# Patient Record
Sex: MALE | Race: WHITE | NOT HISPANIC OR LATINO | Employment: FULL TIME | URBAN - METROPOLITAN AREA
[De-identification: names, ages, dates, MRNs, and addresses within clinical notes are randomized per-mention and may not be internally consistent; named-entity substitution may affect disease eponyms.]

---

## 2017-03-17 ENCOUNTER — TRANSFERRED RECORDS (OUTPATIENT)
Dept: CARDIOLOGY | Facility: CLINIC | Age: 57
End: 2017-03-17

## 2017-03-17 LAB
ALBUMIN SERPL-MCNC: 4.4 G/DL (ref 3.5–5.2)
ALP SERPL-CCNC: 83 U/L (ref 50–136)
ALT SERPL-CCNC: 54 U/L (ref 8–45)
ANION GAP SERPL CALCULATED.3IONS-SCNC: 5 MMOL/L (ref 5–18)
AST SERPL-CCNC: 33 U/L (ref 2–40)
BILIRUB SERPL-MCNC: 0.6 MG/DL (ref 0.2–1.2)
BUN SERPL-MCNC: 23 MG/DL (ref 8–25)
CALCIUM SERPL-MCNC: 9.6 MG/DL (ref 8.5–10.5)
CHLORIDE SERPLBLD-SCNC: 106 MMOL/L (ref 98–110)
CHOL/HDLC RATIO - QUEST: NORMAL
CHOLESTEROL, TOTAL: 158 MG/DL (ref 100–199)
CO2 SERPL-SCNC: 27 MMOL/L (ref 21–31)
CREAT SERPL-MCNC: 0.97 MG/DL (ref 0.72–1.25)
GFR SERPL CREATININE-BSD FRML MDRD: ABNORMAL ML/MIN/1.73M2
GLUCOSE SERPL-MCNC: 90 MG/DL (ref 65–100)
HCT VFR BLD AUTO: 43.5 % (ref 37–53)
HDLC SERPL-MCNC: 38 MG/DL
HEMOGLOBIN: 14.5 G/DL (ref 13.5–17.5)
LDL CHOLESTEROL: 97 MG/DL
PLATELET # BLD AUTO: 206 10^9/L (ref 140–440)
POTASSIUM SERPL-SCNC: 4.2 MMOL/L (ref 3.5–5)
PROT SERPL-MCNC: 7 G/DL (ref 6–8)
SODIUM SERPL-SCNC: 138 MMOL/L (ref 135–145)
TRIGL SERPL-MCNC: 116 MG/DL
WBC # BLD AUTO: 5.8 10*3/UL (ref 4.5–11)

## 2017-06-29 ENCOUNTER — PRE VISIT (OUTPATIENT)
Dept: CARDIOLOGY | Facility: CLINIC | Age: 57
End: 2017-06-29

## 2017-06-29 NOTE — TELEPHONE ENCOUNTER
Call back from patient - he set up self referral for rapid HR in middle of night that awakes him from sleep and then he when he sits upright gone within 30 seconds.  Denies chest pain, SOB, presyncope or syncope. Patient states he is an avid runner and exercises without complaint or concerns. PMD @ [facility] - patient states seen for annual visit approximately 2 months ago with labs - fax out to [facility] for records.   Debora Weathers RN 06/29/17 9:25 AM

## 2017-06-30 ENCOUNTER — OFFICE VISIT (OUTPATIENT)
Dept: CARDIOLOGY | Facility: CLINIC | Age: 57
End: 2017-06-30
Payer: COMMERCIAL

## 2017-06-30 ENCOUNTER — HOSPITAL ENCOUNTER (OUTPATIENT)
Dept: CARDIOLOGY | Facility: CLINIC | Age: 57
Discharge: HOME | End: 2017-06-30
Attending: INTERNAL MEDICINE | Admitting: INTERNAL MEDICINE
Payer: COMMERCIAL

## 2017-06-30 VITALS
HEART RATE: 85 BPM | OXYGEN SATURATION: 94 % | BODY MASS INDEX: 25.62 KG/M2 | DIASTOLIC BLOOD PRESSURE: 87 MMHG | WEIGHT: 179 LBS | SYSTOLIC BLOOD PRESSURE: 130 MMHG | HEIGHT: 70 IN

## 2017-06-30 DIAGNOSIS — G47.30 SLEEP APNEA, UNSPECIFIED TYPE: ICD-10-CM

## 2017-06-30 DIAGNOSIS — R00.2 PALPITATIONS: ICD-10-CM

## 2017-06-30 DIAGNOSIS — R00.2 PALPITATIONS: Primary | ICD-10-CM

## 2017-06-30 PROCEDURE — 99204 OFFICE O/P NEW MOD 45 MIN: CPT | Mod: 25 | Performed by: INTERNAL MEDICINE

## 2017-06-30 PROCEDURE — 93270 REMOTE 30 DAY ECG REV/REPORT: CPT

## 2017-06-30 PROCEDURE — 93272 ECG/REVIEW INTERPRET ONLY: CPT | Performed by: INTERNAL MEDICINE

## 2017-06-30 PROCEDURE — 93000 ELECTROCARDIOGRAM COMPLETE: CPT | Performed by: INTERNAL MEDICINE

## 2017-06-30 NOTE — PATIENT INSTRUCTIONS
1. Event monitor.    2. Follow up in one month.    If you have any questions or concerns, please call my nurse Debora Sainz at [phone].

## 2017-06-30 NOTE — LETTER
"6/30/2017    Mauricio Chowdary MD  [facility] [address]  [facility] [address]    RE: Don S Rosler       Dear Colleague,    I had the pleasure of seeing Michael S Rosler in the [facility].    PRIMARY CARE PROVIDER:  Mauricio Chowdary  [facility] [address]  [facility] [address]    REASON FOR VISIT/CHIEF COMPLAINT:  1. Palpitations.    HISTORY OF PRESENT ILLNESS:  Mr. Escalante is new to my practice.  He is a delightful 56-year-old  gentleman, who is employed as a  for [employer].  He is slim with a BMI of 25.74./M square, is an avid exerciser, has never used tobacco products.  He is on 40 mg of atorvastatin for hyperlipidemia, mild sleep apnea, and migraine headaches but is otherwise in excellent health.    For the last few weeks he has been waking up from sleep 1 to 2 times a night with brief, rapid palpitations.  He goes to bed at approximately 11 AM and wakes up at the 4 to 5 a.m. with \"racing of his heart\".  It resolves within a minute of his waking up.  It is not associated with chest pain, dyspnea, dizziness.  He has never been presyncopal or syncopal.  He was diagnosed with mild sleep apnea seven years ago and tried CPAP for 3-4 months but found it very difficult to sleep with it.  He now uses an oral appliance recommended by the sleep clinic and his snoring is significantly reduced.  He does not consume any caffeine, no alcohol, no over-the-counter energy drinks.  He exercises frequently at a health club, 4-5 times a week for 90 minutes doing weights, machines and cardio.  He never gets palpitations during exertion.  In fact, he recently biked 26 miles without any limitation.    Unfortunately, over the last two years, he has lost three family members - both parents and a sister (to a brain tumor).  He has been under understandable emotional stress, but has plenty of social support and thinks he has been dealing with this well.      REVIEW OF " "SYSTEMS:  A comprehensive 10-point review of systems was completed and the pertinent positives are documented in the history of present illness.    Skin:  Negative     Eyes:  Positive for glasses  ENT:  Negative    Respiratory:  Positive for sleep apnea;CPAP (uses oral device)  Cardiovascular:    Positive for;palpitations (racing, occurs @ hs wakes patient)  Gastroenterology: Negative    Genitourinary:  Positive for (hx kidney stones)    Musculoskeletal:  Negative    Neurologic:  Negative    Psychiatric:  Negative    Heme/Lymph/Imm:  Negative    Endocrine:  Negative      CURRENT MEDICATIONS:  Current Outpatient Prescriptions   Medication Sig Dispense Refill     Multiple Vitamins-Minerals (MULTIVITAMIN ADULTS PO) Take by mouth daily       Atorvastatin Calcium (LIPITOR PO) Take 40 mg by mouth At Bedtime        aspirin EC 81 MG tablet Take 81 mg by mouth daily         ALLERGIES:  No Known Allergies    PAST MEDICAL HISTORY:    Past Medical History:   Diagnosis Date     Hyperlipidemia      Insomnia      Keratosis seborrheica      Kidney stone      Migraine      Other anxiety states        PAST SURGICAL HISTORY:    Past Surgical History:   Procedure Laterality Date     HERNIA REPAIR, INGUINAL RT/LT  3/06    bilateral     VASECTOMY           FAMILY HISTORY:    Family History   Problem Relation Age of Onset     Chronic Obstructive Pulmonary Disease Mother      Neurologic Disorder Sister      Brain Tumor Sister 58     Other - See Comments Father 84     old age       SOCIAL HISTORY:  .  Lives with a girlfriend.  Works as a  for [employer].  Never used tobacco products.  Rare alcohol intake-one drink every two months.  No recreational drug use.    PHYSICAL EXAM:    Vitals: /87  Pulse 85  Ht 1.778 m (5' 10\")  Wt 81.2 kg (179 lb)  SpO2 94%  BMI 25.68 kg/m2    Constitutional: Comfortable at rest. Cooperative, alert and oriented, well developed, well nourished.  Eyes: Pupils equal and round, " conjunctivae and lids unremarkable, sclera white, no xanthalasma,   ENT: Satisfactory dentition.  No cyanosis or pallor.  Neck: Carotid pulses are full and equal bilaterally, no carotid bruit, no thyromegaly     Respiratory: Normal respiratory effort with symmetrical chest wall movements and no use of accessory muscles. Good air entry with normal breath sounds and no rales or wheeze.  Cardiovascular: Normal jugular venous pulse and pressure.  Normal carotid pulse character and volume.  No carotid bruit.  Apical impulse is undisplaced and normal to palpation without parasternal heave or thrill.  Heart sounds are normal and regular. No audible murmur. No S3, S4 or friction rub.    GI: Soft, nontender, no hepatosplenomegaly, no masses, active bowel sounds.    Lymph/Hematologic: Not examined.  Skin: No rash, erythema, ecchymosis.  Musculoskeletal: Normal muscle tone and strength. Normal gait. No spinal deformities.  Neuropsychiatric: Oriented to time place and person.  Affect normal.  No gross motor deficits.  Extremities: No edema. No clubbing or deformities.    DIAGNOSTIC DATA:  I reviewed up artery data and the pertinent tests.  Results were discussed with the patient.    LABORATORY DATA  - Reviewed in Care Everywhere.  TSH 1.37. Creatinine 0.97, Na 138, potassium 4.2, Hb 14.5.    ECG: Done today. Normal sinus rhythm, 78 BPM.    EXERCISE [facility]ARDIOGRAM[facility]  1.  Normal stress echocardiogram.  There are no EKG or echo wall motion abnormalities suggestive of ischemia.  2.  Average exercise capacity and cardiovascular conditioning.  3.  Normal heart rate and blood pressure response to exercise.  4.  No exercise-induced arrhythmias.    DIAGNOSES:  1. Palpitations.  2. Obstructive sleep apnea, mild, uses oral appliance. CPAP intolerant.  3. Hyperlipidemia.    ASSESSMENT:  The patient is a relatively healthy, slim and physically active  male with episodic tachy-palpitations that wake him up at night but  are not associated with any other concerning features. He has sleep apnea that maybe partially treated with an oral appliance. Atrial arrhythmias should be ruled out.    PLAN:  1. Event monitor.  2. Follow up in one month.  3. Advised follow up with his sleep physician.    It was a pleasure to visit with the patient.  I spent 30 minutes with the patient, greater than 50% of the time was spent in counseling and coordination of care. Questions were  answered to the patient's satisfaction.  Encounter Diagnoses   Name Primary?     Palpitations Yes     Sleep apnea, unspecified type        Orders Placed This Encounter   Procedures     Follow-Up with Cardiologist     EKG 12-lead complete w/read - Clinics (performed today)     Cardiac Event Monitor - Peds/Adult     Thank you for allowing me to participate in the care of your patient.    Sincerely,     Christina Bolivar MD     [facility]

## 2017-06-30 NOTE — PROGRESS NOTES
"REFERRING PROVIDER:  No referring provider defined for this encounter.    PRIMARY CARE PROVIDER:  Mauricio Chowdary  [facility] [address]  [facility] [id number]    REASON FOR VISIT/CHIEF COMPLAINT:  1. Palpitations.    HISTORY OF PRESENT ILLNESS:  Mr. Escalante is new to my practice.  He is a delightful 56-year-old  gentleman, who is employed as a  for [employer].  He is slim with a BMI of 25.74./M square, is an avid exerciser, has never used tobacco products.  He is on 40 mg of atorvastatin for hyperlipidemia, mild sleep apnea, and migraine headaches but is otherwise in excellent health.    For the last few weeks he has been waking up from sleep 1 to 2 times a night with brief, rapid palpitations.  He goes to bed at approximately 11 AM and wakes up at the 4 to 5 a.m. with \"racing of his heart\".  It resolves within a minute of his waking up.  It is not associated with chest pain, dyspnea, dizziness.  He has never been presyncopal or syncopal.  He was diagnosed with mild sleep apnea seven years ago and tried CPAP for 3-4 months but found it very difficult to sleep with it.  He now uses an oral appliance recommended by the sleep clinic and his snoring is significantly reduced.  He does not consume any caffeine, no alcohol, no over-the-counter energy drinks.  He exercises frequently at a health club, 4-5 times a week for 90 minutes doing weights, machines and cardio.  He never gets palpitations during exertion.  In fact, he recently biked 26 miles without any limitation.    Unfortunately, over the last two years, he has lost three family members - both parents and a sister (to a brain tumor).  He has been under understandable emotional stress, but has plenty of social support and thinks he has been dealing with this well.    REVIEW OF SYSTEMS:  A comprehensive 10-point review of systems was completed and the pertinent positives are documented in the history of present illness.    Skin:  " "Negative     Eyes:  Positive for glasses  ENT:  Negative    Respiratory:  Positive for sleep apnea;CPAP (uses oral device)  Cardiovascular:    Positive for;palpitations (racing, occurs @ hs wakes patient)  Gastroenterology: Negative    Genitourinary:  Positive for (hx kidney stones)    Musculoskeletal:  Negative    Neurologic:  Negative    Psychiatric:  Negative    Heme/Lymph/Imm:  Negative    Endocrine:  Negative      CURRENT MEDICATIONS:  Current Outpatient Prescriptions   Medication Sig Dispense Refill     Multiple Vitamins-Minerals (MULTIVITAMIN ADULTS PO) Take by mouth daily       Atorvastatin Calcium (LIPITOR PO) Take 40 mg by mouth At Bedtime        aspirin EC 81 MG tablet Take 81 mg by mouth daily         ALLERGIES:  No Known Allergies    PAST MEDICAL HISTORY:    Past Medical History:   Diagnosis Date     Hyperlipidemia      Insomnia      Keratosis seborrheica      Kidney stone      Migraine      Other anxiety states        PAST SURGICAL HISTORY:    Past Surgical History:   Procedure Laterality Date     HERNIA REPAIR, INGUINAL RT/LT  3/06    bilateral     VASECTOMY         FAMILY HISTORY:    Family History   Problem Relation Age of Onset     Chronic Obstructive Pulmonary Disease Mother      Neurologic Disorder Sister      Brain Tumor Sister 58     Other - See Comments Father 84     old age       SOCIAL HISTORY:  .  Lives with a girlfriend.  Works as a  for [employer].  Never used tobacco products.  Rare alcohol intake-one drink every two months.  No recreational drug use.    PHYSICAL EXAM:    Vitals: /87  Pulse 85  Ht 1.778 m (5' 10\")  Wt 81.2 kg (179 lb)  SpO2 94%  BMI 25.68 kg/m2    Constitutional: Comfortable at rest. Cooperative, alert and oriented, well developed, well nourished.  Eyes: Pupils equal and round, conjunctivae and lids unremarkable, sclera white, no xanthalasma,   ENT: Satisfactory dentition.  No cyanosis or pallor.  Neck: Carotid pulses are full and " equal bilaterally, no carotid bruit, no thyromegaly     Respiratory: Normal respiratory effort with symmetrical chest wall movements and no use of accessory muscles. Good air entry with normal breath sounds and no rales or wheeze.  Cardiovascular: Normal jugular venous pulse and pressure.  Normal carotid pulse character and volume.  No carotid bruit.  Apical impulse is undisplaced and normal to palpation without parasternal heave or thrill.  Heart sounds are normal and regular. No audible murmur. No S3, S4 or friction rub.    GI: Soft, nontender, no hepatosplenomegaly, no masses, active bowel sounds.    Lymph/Hematologic: Not examined.  Skin: No rash, erythema, ecchymosis.  Musculoskeletal: Normal muscle tone and strength. Normal gait. No spinal deformities.  Neuropsychiatric: Oriented to time place and person.  Affect normal.  No gross motor deficits.  Extremities: No edema. No clubbing or deformities.    DIAGNOSTIC DATA:  I reviewed up artery data and the pertinent tests.  Results were discussed with the patient.    LABORATORY DATA  - Reviewed in Care Everywhere.  TSH 1.37. Creatinine 0.97, Na 138, potassium 4.2, Hb 14.5.    ECG: Done today. Normal sinus rhythm, 78 BPM.    EXERCISE ECHOCARDIOGRAM: [employer].  1.  Normal stress echocardiogram.  There are no EKG or echo wall motion abnormalities suggestive of ischemia.  2.  Average exercise capacity and cardiovascular conditioning.  3.  Normal heart rate and blood pressure response to exercise.  4.  No exercise-induced arrhythmias.    DIAGNOSES:  1. Palpitations.  2. Obstructive sleep apnea, mild, uses oral appliance. CPAP intolerant.  3. Hyperlipidemia.    ASSESSMENT:  The patient is a relatively healthy, slim and physically active  male with episodic tachy-palpitations that wake him up at night but are not associated with any other concerning features. He has sleep apnea that maybe partially treated with an oral appliance. Atrial arrhythmias should be  ruled out.    PLAN:  1. Event monitor.  2. Follow up in one month.  3. Advised follow up with his sleep physician.      It was a pleasure to visit with the patient.  I spent 30 minutes with the patient, greater than 50% of the time was spent in counseling and coordination of care. Questions were  answered to the patient's satisfaction.  Encounter Diagnoses   Name Primary?     Palpitations Yes     Sleep apnea, unspecified type        Orders Placed This Encounter   Procedures     Follow-Up with Cardiologist     EKG 12-lead complete w/read - Clinics (performed today)     Cardiac Event Monitor - Peds/Adult       CC  REFERRING PROVIDER:  No referring provider defined for this encounter.

## 2017-06-30 NOTE — MR AVS SNAPSHOT
After Visit Summary   6/30/2017    Michael S Rosler    MRN: [id number]           Patient Information     Date Of Birth          1960        Visit Information        Provider Department      6/30/2017 7:45 AM Christina Boilvar MD [facility]        Today's Diagnoses     Palpitations    -  1    Sleep apnea, unspecified type          Care Instructions    1. Event monitor.    2. Follow up in one month.    If you have any questions or concerns, please call my nurse Debora Sainz at [phone]..          Follow-ups after your visit        Additional Services     Follow-Up with Cardiologist                 Future tests that were ordered for you today     Open Future Orders        Priority Expected Expires Ordered    Follow-Up with Cardiologist Routine 7/30/2017 6/30/2018 6/30/2017    Cardiac Event Monitor - Peds/Adult Routine 6/30/2017 6/30/2018 6/30/2017            Who to contact     If you have questions or need follow up information about today's clinic visit or your schedule please contact [facility] directly at [phone].  Normal or non-critical lab and imaging results will be communicated to you by Open Lendinghart, letter or phone within 4 business days after the clinic has received the results. If you do not hear from us within 7 days, please contact the clinic through [employer] or phone. If you have a critical or abnormal lab result, we will notify you by phone as soon as possible.  Submit refill requests through GateMe or call your pharmacy and they will forward the refill request to us. Please allow 3 business days for your refill to be completed.          Additional Information About Your Visit        Open Lendinghart Information     [employer]Me gives you secure access to your electronic health record. If you see a primary care provider, you can also send messages to your care team and make appointments. If you have  "questions, please call your primary care clinic.  If you do not have a primary care provider, please call [phone] and they will assist you.        Care EveryWhere ID     This is your Care EveryWhere ID. This could be used by other organizations to access your [place] medical records  [id number]        Your Vitals Were     Pulse Height Pulse Oximetry BMI (Body Mass Index)          85 1.778 m (5' 10\") 94% 25.68 kg/m2         Blood Pressure from Last 3 Encounters:   06/30/17 130/87   08/28/16 (!) 133/112   01/26/15 129/81    Weight from Last 3 Encounters:   06/30/17 81.2 kg (179 lb)   08/28/16 79.4 kg (175 lb)   01/26/15 77.1 kg (170 lb)              We Performed the Following     EKG 12-lead complete w/read - Clinics (performed today)        Primary Care Provider Office Phone # Fax #    Mauricio Chowdary [phone]       [facility] [address]  [facility] [address]        Equal Access to Services     [facility]: Hadii aad ku hadasho Soomaali, waaxda luqadaha, qaybta kaalmada adefernandoyasami, nica garces . So [facility] [phone].    ATENCIÓN: Si habla español, tiene a molina disposición servicios gratuitos de asistencia lingüística. [facility] [phone].    We comply with applicable federal civil rights laws and [place] laws. We do not discriminate on the basis of race, color, national origin, age, disability sex, sexual orientation or gender identity.            Thank you!     Thank you for choosing [facility] [place]  for your care. Our goal is always to provide you with excellent care. Hearing back from our patients is one way we can continue to improve our services. Please take a few minutes to complete the written survey that you may receive in the mail after your visit with us. Thank you!             Your Updated Medication List - Protect others around you: Learn how to safely use, store and throw away your medicines at " www.disposemymeds.org.          This list is accurate as of: 6/30/17  8:52 AM.  Always use your most recent med list.                   Brand Name Dispense Instructions for use Diagnosis    aspirin EC 81 MG EC tablet      Take 81 mg by mouth daily        LIPITOR PO      Take 40 mg by mouth At Bedtime        MULTIVITAMIN ADULTS PO      Take by mouth daily

## 2017-07-03 ENCOUNTER — CARE COORDINATION (OUTPATIENT)
Dept: CARDIOLOGY | Facility: CLINIC | Age: 57
End: 2017-07-03

## 2017-07-03 NOTE — PROGRESS NOTES
Patient called stating he had racing heart feeling last night while wearing monitor but noted his pulse check showed normal.  Encouraged patient to utilize the monitor as ordered to try and correlate sympotms with arrhythmia vs non-arrhythmia.  Patient states agreement to same.   Debora Weathers RN 07/03/17 11:59 AM

## 2017-07-06 NOTE — PROGRESS NOTES
Received Cardionet strips:  7/1/17 at 05:57 showing SR, HR 90, reported sx: none  7/1/17 at 13:00 showing SR, , reported sx: none  7/3/17 at 21:34 showing SR HR 70, reported sx: heart racing  7/3/17 at 23:22 showing SR, HR 80, reported sx: none    Filed to report in Dr. Bolivar's inbox. F/u w/ Dr. Bolivar on 8/4/17.

## 2017-07-14 NOTE — PROGRESS NOTES
Cardionet Event monitor strips - dates 7/7/20107 Sinus Rhythm to Sinus Tach HR's 96 to 115 bpm; 7/8/2017 Sinus Rhythm HR 72 bpm; 7/11/2017 Sinus Rhythm 62 bpm; 7/12/2017 Sinus Rhythm 70 bpm. Reported Symptoms of heart racing at time of monitor strips. Strips filed.  Debora Weathers RN 07/14/17 4:26 PM

## 2017-07-19 NOTE — PROGRESS NOTES
Cardionet Event monitor strip - date 7/15/2017 Sinus tachycardia 101 bpm. Filed.  Has visit scheduled with provider 7/27/2017.   Debora Weathers RN 07/19/17 11:35 AM

## 2017-07-27 ENCOUNTER — OFFICE VISIT (OUTPATIENT)
Dept: CARDIOLOGY | Facility: CLINIC | Age: 57
End: 2017-07-27
Attending: INTERNAL MEDICINE
Payer: COMMERCIAL

## 2017-07-27 VITALS
WEIGHT: 179 LBS | HEART RATE: 87 BPM | HEIGHT: 70 IN | DIASTOLIC BLOOD PRESSURE: 74 MMHG | BODY MASS INDEX: 25.62 KG/M2 | SYSTOLIC BLOOD PRESSURE: 117 MMHG

## 2017-07-27 DIAGNOSIS — G47.00 INSOMNIA, UNSPECIFIED TYPE: Primary | ICD-10-CM

## 2017-07-27 DIAGNOSIS — R00.2 PALPITATIONS: ICD-10-CM

## 2017-07-27 DIAGNOSIS — G47.30 SLEEP APNEA, UNSPECIFIED TYPE: ICD-10-CM

## 2017-07-27 PROCEDURE — 99213 OFFICE O/P EST LOW 20 MIN: CPT | Performed by: INTERNAL MEDICINE

## 2017-07-27 NOTE — LETTER
7/27/2017    Mauricio Chowdary MD  [employer]   [address]  [facility] [address]    RE: Don S Rosler       Dear Colleague,    I had the pleasure of seeing Don GEE Rosler in the [facility].    HPI/Assessment - See dictation [id number]      LOCATION:  [facility]    REFERRING AND PRIMARY CARE PROVIDER:  Dr. Mauricio Chowdary, [employer], [address], [place] [address]    REASON FOR VISIT:    1.  Followup of palpitations.  2.  Discuss test results -- event monitor.    HISTORY OF PRESENT ILLNESS:    The patient is known to me from his initial consult visit on 06/30/2017 for episodic palpitations that only occur when he is asleep and wake him up at night, and are very brief, lasting about 5 minutes without any associated symptoms.  He is a polite 56-year-old gentleman who is employed as a  for [employer].  He is slim with a BMI of 25.74 kg/meters squared and avid exerciser, has never smoked, does not drink caffeine or alcohol or over-the-counter energy drinks.  He is on 40 mg of atorvastatin for hyperlipidemia, has mild sleep apnea which is being corrected with an oral appliance, and intermittent migraine headaches but is otherwise in excellent health.    He has had a stressful last couple of years having undergone significant bereavement -- both his parents as well as a sister to a brain tumor.  His job is also fairly high stress which he self-identifies as something he thrives in.  He is also very involved in his 19-year-old son's life.  The patient bikes several miles a week, exercises at the health club 4-5 times a week for 90 minutes and is able to do that without any cardiovascular symptoms including palpitation.  There is no concerning family history.    CURRENT MEDICATIONS:    1.  Multivitamin.  2.  Atorvastatin 40 mg daily.    ALLERGIES:  No known.    SOCIAL HISTORY:  .  Has a girlfriend.  Works as a  for  [employer].  Nonsmoker, rare infrequent alcohol use.  No recreational drug use.  Avid exerciser.    PHYSICAL EXAMINATION:    Vital Signs:  Today showed a blood pressure of 117/74, pulse 87 beats per minutes, height 1.7, weight 81.2 kg, BMI 25.74 kg/meters squared.  General:  Comfortable at rest, slim, normal mood and affect, oriented, well developed, well nourished.  I did not do a detailed physical examination.    DIAGNOSTIC DATA:    His recent outside labs show a normal TSH of 1.37, creatinine 0.9.  Electrolytes are normal.  Hemoglobin is 14.5.     Recent ECG shows normal sinus rhythm at 78 beats per minute.    His exercise echocardiogram done at [facility] was normal with average exercise capacity and cardiovascular conditioning.  No arrhythmias.     I had ordered an event monitor which he has completed.  He did transmit several episodes of palpitations that woke him up from sleep.  During all of these, he was in normal sinus rhythm although with a slightly higher than expected heart rate ranging from the 70s to 120 bpm.  No arrhythmias.     DIAGNOSIS:    1.  Benign palpitations with no evidence of arrhythmia on event monitor but higher than expected sinus rates during sleep.   2.  Mild obstructive sleep apnea, uses oral appliance, CPAP intolerant.  3.  Hyperlipidemia.    ASSESSMENT/PLAN:    The patient is healthy 56 year old male, with a structurally normal heart, normal stress test, normal TSH, minimal stimulant use who has palpitations that only occur briefly and wake at night.  Although his event monitor reported that during his symptomatic episodes he is in sinus rhythm, his heart rate is somewhat higher than what would be expected at nighttime (2-5 a.m.).  The patient's states that he has insomnia and goes to sleep at 11 p.m. and wakes up at 5 a.m.     1.  I reassured him that we have not identified any arrhythmias during his symptoms.    2.  Recommend followup with a sleep provider to assess  for insomnia or other sleep disordered breathing.    3.  He also has been under significant emotional stress in the last 2 years, although overtly he is coping very well.  We did talk about relaxation techniques such as yoga and meditation and he is very keen to pursue this.     4.  Cardiology followup and additional testing not indicated.    It was a pleasure to visit with the patient.  I spent 20 minutes with him. Greater than 50% of the time was spent in counseling and coordination of care.     CC:  Dr. Gerry Chowdary, [facility],  [address], [place] [id number]        Christina Bolivar MD    D:  07/28/2017 12:18 T:  07/28/2017 16:09  Document:  [id number]    Thank you for allowing me to participate in the care of your patient.      Sincerely,     Christina Bolivar MD     [facility]

## 2017-07-27 NOTE — MR AVS SNAPSHOT
After Visit Summary   7/27/2017    Michael S Rosler    MRN: [id number]           Patient Information     Date Of Birth          1960        Visit Information        Provider Department      7/27/2017 7:45 AM Christina Bolivar MD [facility] [place]        Today's Diagnoses     Insomnia, unspecified type    -  1    Palpitations        Sleep apnea, unspecified type          Care Instructions    1. Refer to sleep clinic for insomnia evaluation.    2. No cardiology follow up.          Follow-ups after your visit        Additional Services     SLEEP EVALUATION & MANAGEMENT REFERRAL - ADULT       See above                  Future tests that were ordered for you today     Open Future Orders        Priority Expected Expires Ordered    SLEEP EVALUATION & MANAGEMENT REFERRAL - ADULT Routine 8/1/2017 7/27/2018 7/27/2017            Who to contact     If you have questions or need follow up information about today's clinic visit or your schedule please contact [facility] [place] directly at [phone].  Normal or non-critical lab and imaging results will be communicated to you by MyChart, letter or phone within 4 business days after the clinic has received the results. If you do not hear from us within 7 days, please contact the clinic through [employer] or phone. If you have a critical or abnormal lab result, we will notify you by phone as soon as possible.  Submit refill requests through [employer] or call your pharmacy and they will forward the refill request to us. Please allow 3 business days for your refill to be completed.          Additional Information About Your Visit        MyChart Information     [employer] gives you secure access to your electronic health record. If you see a primary care provider, you can also send messages to your care team and make appointments. If you have questions, please call your primary care clinic.  If you  "do not have a primary care provider, please call [phone] and they will assist you.        Care EveryWhere ID     This is your Care EveryWhere ID. This could be used by other organizations to access your [place] medical records  [id number]        Your Vitals Were     Pulse Height BMI (Body Mass Index)             87 1.778 m (5' 10\") 25.68 kg/m2          Blood Pressure from Last 3 Encounters:   07/27/17 117/74   06/30/17 130/87   08/28/16 (!) 133/112    Weight from Last 3 Encounters:   07/27/17 81.2 kg (179 lb)   06/30/17 81.2 kg (179 lb)   08/28/16 79.4 kg (175 lb)              We Performed the Following     Follow-Up with Cardiologist        Primary Care Provider Office Phone # Fax #    Mauricio Chowdary [phone]       [employer]  [address]  [facility] [address]        Equal Access to Services     KANDACE [facility]W : Hadii aad ku hadasho Soomaali, waaxda luqadaha, qaybta kaalmada adeegyada, waxay idiin haydavien vern garces . So [facility] [phone].    ATENCIÓN: Si habla español, tiene a molina disposición servicios gratuitos de asistencia lingüística. Llame al [phone].    We comply with applicable federal civil rights laws and [place] laws. We do not discriminate on the basis of race, color, national origin, age, disability sex, sexual orientation or gender identity.            Thank you!     Thank you for choosing [facility] [place]  for your care. Our goal is always to provide you with excellent care. Hearing back from our patients is one way we can continue to improve our services. Please take a few minutes to complete the written survey that you may receive in the mail after your visit with us. Thank you!             Your Updated Medication List - Protect others around you: Learn how to safely use, store and throw away your medicines at www.disposemymeds.org.          This list is accurate as of: 7/27/17  8:36 AM.  Always use your most recent " med list.                   Brand Name Dispense Instructions for use Diagnosis    LIPITOR PO      Take 40 mg by mouth At Bedtime        MULTIVITAMIN ADULTS PO      Take by mouth daily

## 2017-07-29 NOTE — PROGRESS NOTES
LOCATION:  [facility]    REFERRING AND PRIMARY CARE PROVIDER:  Dr. Mauricio Chowdary, [facility], [address], [place], [place] [address]    REASON FOR VISIT:    1.  Followup of palpitations.  2.  Discuss test results -- event monitor.    HISTORY OF PRESENT ILLNESS:    The patient is known to me from his initial consult visit on 06/30/2017 for episodic palpitations that only occur when he is asleep and wake him up at night, and are very brief, lasting about 5 minutes without any associated symptoms.  He is a polite 56-year-old gentleman who is employed as a  for [employer].  He is slim with a BMI of 25.74 kg/meters squared and avid exerciser, has never smoked, does not drink caffeine or alcohol or over-the-counter energy drinks.  He is on 40 mg of atorvastatin for hyperlipidemia, has mild sleep apnea which is being corrected with an oral appliance, and intermittent migraine headaches but is otherwise in excellent health.    He has had a stressful last couple of years having undergone significant bereavement -- both his parents as well as a sister to a brain tumor.  His job is also fairly high stress which he self-identifies as something he thrives in.  He is also very involved in his 19-year-old son's life.  The patient bikes several miles a week, exercises at the health club 4-5 times a week for 90 minutes and is able to do that without any cardiovascular symptoms including palpitation.  There is no concerning family history.    CURRENT MEDICATIONS:    1.  Multivitamin.  2.  Atorvastatin 40 mg daily.    ALLERGIES:  No known.    SOCIAL HISTORY:  .  Has a girlfriend.  Works as a  for [employer].  Nonsmoker, rare infrequent alcohol use.  No recreational drug use.  Avid exerciser.    PHYSICAL EXAMINATION:    Vital Signs:  Today showed a blood pressure of 117/74, pulse 87 beats per minutes, height 1.7, weight 81.2 kg, BMI 25.74 kg/meters  squared.  General:  Comfortable at rest, slim, normal mood and affect, oriented, well developed, well nourished.  I did not do a detailed physical examination.    DIAGNOSTIC DATA:    His recent outside labs show a normal TSH of 1.37, creatinine 0.9.  Electrolytes are normal.  Hemoglobin is 14.5.     Recent ECG shows normal sinus rhythm at 78 beats per minute.    His exercise echocardiogram done at [facility] was normal with average exercise capacity and cardiovascular conditioning.  No arrhythmias.     I had ordered an event monitor which he has completed.  He did transmit several episodes of palpitations that woke him up from sleep.  During all of these, he was in normal sinus rhythm although with a slightly higher than expected heart rate ranging from the 70s to 120 bpm.  No arrhythmias.     DIAGNOSIS:    1.  Benign palpitations with no evidence of arrhythmia on event monitor but higher than expected sinus rates during sleep.   2.  Mild obstructive sleep apnea, uses oral appliance, CPAP intolerant.  3.  Hyperlipidemia.    ASSESSMENT/PLAN:    The patient is healthy 56 year old male, with a structurally normal heart, normal stress test, normal TSH, minimal stimulant use who has palpitations that only occur briefly and wake at night.  Although his event monitor reported that during his symptomatic episodes he is in sinus rhythm, his heart rate is somewhat higher than what would be expected at nighttime (2-5 a.m.).  The patient's states that he has insomnia and goes to sleep at 11 p.m. and wakes up at 5 a.m.     1.  I reassured him that we have not identified any arrhythmias during his symptoms.    2.  Recommend followup with a sleep provider to assess for insomnia or other sleep disordered breathing.    3.  He also has been under significant emotional stress in the last 2 years, although overtly he is coping very well.  We did talk about relaxation techniques such as yoga and meditation and he is very keen  to pursue this.     4.  Cardiology followup and additional testing not indicated.    It was a pleasure to visit with the patient.  I spent 20 minutes with him. Greater than 50% of the time was spent in counseling and coordination of care.     CC:  Dr. Gerry Chowdary, [facility],  [address], [place] [phone]        [facility] Apurva Bolivar MD    D:  07/28/2017 12:18 T:  07/28/2017 16:09  Document:  [id number]

## 2017-09-08 NOTE — PROGRESS NOTES
9/8/2017 Dr Bolivar signed final report for cardionet -  NSR /ST w/HR's  bpm.  No follow up scheduled.  Called to patient to discuss and reschedule follow that was cancelled.  Debora Weathers RN 09/08/17 1:44 PM    148 PM - call back from patient - staes was discussed at last visit with Dr Bolivar and he has no further questions or concerns.  Debora Weathers RN 09/08/17 1:49 PM

## 2017-09-29 ENCOUNTER — APPOINTMENT (OUTPATIENT)
Dept: CT IMAGING | Facility: CLINIC | Age: 57
End: 2017-09-29
Attending: EMERGENCY MEDICINE
Payer: COMMERCIAL

## 2017-09-29 ENCOUNTER — HOSPITAL ENCOUNTER (EMERGENCY)
Facility: CLINIC | Age: 57
Discharge: HOME | End: 2017-09-29
Attending: EMERGENCY MEDICINE | Admitting: EMERGENCY MEDICINE
Payer: COMMERCIAL

## 2017-09-29 VITALS
SYSTOLIC BLOOD PRESSURE: 126 MMHG | OXYGEN SATURATION: 96 % | BODY MASS INDEX: 25.2 KG/M2 | HEART RATE: 123 BPM | HEIGHT: 71 IN | WEIGHT: 180 LBS | DIASTOLIC BLOOD PRESSURE: 84 MMHG | RESPIRATION RATE: 16 BRPM | TEMPERATURE: 97.8 F

## 2017-09-29 VITALS
RESPIRATION RATE: 18 BRPM | DIASTOLIC BLOOD PRESSURE: 98 MMHG | BODY MASS INDEX: 24.78 KG/M2 | TEMPERATURE: 96.9 F | SYSTOLIC BLOOD PRESSURE: 140 MMHG | OXYGEN SATURATION: 97 % | HEIGHT: 71 IN | WEIGHT: 177 LBS

## 2017-09-29 DIAGNOSIS — I31.9 PERICARDITIS, UNSPECIFIED CHRONICITY, UNSPECIFIED TYPE: ICD-10-CM

## 2017-09-29 DIAGNOSIS — R01.1 HEART MURMUR: ICD-10-CM

## 2017-09-29 DIAGNOSIS — M25.511 TRIGGER POINT OF RIGHT SHOULDER REGION: ICD-10-CM

## 2017-09-29 LAB
ALBUMIN SERPL-MCNC: 3.8 G/DL (ref 3.4–5)
ALP SERPL-CCNC: 82 U/L (ref 40–150)
ALT SERPL W P-5'-P-CCNC: 49 U/L (ref 0–70)
ANION GAP SERPL CALCULATED.3IONS-SCNC: 7 MMOL/L (ref 3–14)
AST SERPL W P-5'-P-CCNC: 18 U/L (ref 0–45)
BASOPHILS # BLD AUTO: 0 10E9/L (ref 0–0.2)
BASOPHILS NFR BLD AUTO: 0.1 %
BILIRUB DIRECT SERPL-MCNC: 0.2 MG/DL (ref 0–0.2)
BILIRUB SERPL-MCNC: 0.5 MG/DL (ref 0.2–1.3)
BUN SERPL-MCNC: 23 MG/DL (ref 7–30)
CALCIUM SERPL-MCNC: 9.2 MG/DL (ref 8.5–10.1)
CHLORIDE SERPL-SCNC: 106 MMOL/L (ref 94–109)
CO2 SERPL-SCNC: 28 MMOL/L (ref 20–32)
CREAT SERPL-MCNC: 1.07 MG/DL (ref 0.66–1.25)
CRP SERPL-MCNC: 44.7 MG/L (ref 0–8)
DIFFERENTIAL METHOD BLD: ABNORMAL
EOSINOPHIL # BLD AUTO: 0.1 10E9/L (ref 0–0.7)
EOSINOPHIL NFR BLD AUTO: 0.6 %
ERYTHROCYTE [DISTWIDTH] IN BLOOD BY AUTOMATED COUNT: 12.9 % (ref 10–15)
ERYTHROCYTE [SEDIMENTATION RATE] IN BLOOD BY WESTERGREN METHOD: 10 MM/H (ref 0–20)
GFR SERPL CREATININE-BSD FRML MDRD: 71 ML/MIN/1.7M2
GLUCOSE SERPL-MCNC: 90 MG/DL (ref 70–99)
HCT VFR BLD AUTO: 43.2 % (ref 40–53)
HGB BLD-MCNC: 14.4 G/DL (ref 13.3–17.7)
IMM GRANULOCYTES # BLD: 0.1 10E9/L (ref 0–0.4)
IMM GRANULOCYTES NFR BLD: 0.6 %
LIPASE SERPL-CCNC: 114 U/L (ref 73–393)
LYMPHOCYTES # BLD AUTO: 1.4 10E9/L (ref 0.8–5.3)
LYMPHOCYTES NFR BLD AUTO: 14.7 %
MCH RBC QN AUTO: 30.3 PG (ref 26.5–33)
MCHC RBC AUTO-ENTMCNC: 33.3 G/DL (ref 31.5–36.5)
MCV RBC AUTO: 91 FL (ref 78–100)
MONOCYTES # BLD AUTO: 1.5 10E9/L (ref 0–1.3)
MONOCYTES NFR BLD AUTO: 15.9 %
NEUTROPHILS # BLD AUTO: 6.4 10E9/L (ref 1.6–8.3)
NEUTROPHILS NFR BLD AUTO: 68.1 %
PLATELET # BLD AUTO: 195 10E9/L (ref 150–450)
POTASSIUM SERPL-SCNC: 4.3 MMOL/L (ref 3.4–5.3)
PROT SERPL-MCNC: 7.5 G/DL (ref 6.8–8.8)
RBC # BLD AUTO: 4.76 10E12/L (ref 4.4–5.9)
SODIUM SERPL-SCNC: 141 MMOL/L (ref 133–144)
TROPONIN I SERPL-MCNC: <0.015 UG/L (ref 0–0.04)
TROPONIN I SERPL-MCNC: <0.015 UG/L (ref 0–0.04)
WBC # BLD AUTO: 9.3 10E9/L (ref 4–11)

## 2017-09-29 PROCEDURE — 80076 HEPATIC FUNCTION PANEL: CPT | Performed by: EMERGENCY MEDICINE

## 2017-09-29 PROCEDURE — 25000132 ZZH RX MED GY IP 250 OP 250 PS 637: Performed by: EMERGENCY MEDICINE

## 2017-09-29 PROCEDURE — 83690 ASSAY OF LIPASE: CPT | Performed by: EMERGENCY MEDICINE

## 2017-09-29 PROCEDURE — 25000125 ZZHC RX 250: Performed by: EMERGENCY MEDICINE

## 2017-09-29 PROCEDURE — 99285 EMERGENCY DEPT VISIT HI MDM: CPT | Mod: 25

## 2017-09-29 PROCEDURE — 84484 ASSAY OF TROPONIN QUANT: CPT | Performed by: EMERGENCY MEDICINE

## 2017-09-29 PROCEDURE — 71260 CT THORAX DX C+: CPT

## 2017-09-29 PROCEDURE — 85025 COMPLETE CBC W/AUTO DIFF WBC: CPT | Performed by: EMERGENCY MEDICINE

## 2017-09-29 PROCEDURE — 93005 ELECTROCARDIOGRAM TRACING: CPT

## 2017-09-29 PROCEDURE — 25000128 H RX IP 250 OP 636: Performed by: EMERGENCY MEDICINE

## 2017-09-29 PROCEDURE — 96374 THER/PROPH/DIAG INJ IV PUSH: CPT | Mod: 59

## 2017-09-29 PROCEDURE — 99283 EMERGENCY DEPT VISIT LOW MDM: CPT

## 2017-09-29 PROCEDURE — 86140 C-REACTIVE PROTEIN: CPT | Performed by: EMERGENCY MEDICINE

## 2017-09-29 PROCEDURE — 80048 BASIC METABOLIC PNL TOTAL CA: CPT | Performed by: EMERGENCY MEDICINE

## 2017-09-29 PROCEDURE — 85652 RBC SED RATE AUTOMATED: CPT | Performed by: EMERGENCY MEDICINE

## 2017-09-29 RX ORDER — CYCLOBENZAPRINE HCL 10 MG
10 TABLET ORAL ONCE
Status: COMPLETED | OUTPATIENT
Start: 2017-09-29 | End: 2017-09-29

## 2017-09-29 RX ORDER — KETOROLAC TROMETHAMINE 30 MG/ML
30 INJECTION, SOLUTION INTRAMUSCULAR; INTRAVENOUS ONCE
Status: COMPLETED | OUTPATIENT
Start: 2017-09-29 | End: 2017-09-29

## 2017-09-29 RX ORDER — CYCLOBENZAPRINE HCL 10 MG
10 TABLET ORAL 3 TIMES DAILY PRN
Qty: 15 TABLET | Refills: 0 | Status: SHIPPED | OUTPATIENT
Start: 2017-09-29 | End: 2017-10-26

## 2017-09-29 RX ORDER — COLCHICINE 0.6 MG/1
0.6 TABLET ORAL DAILY
Qty: 14 TABLET | Refills: 0 | Status: SHIPPED | OUTPATIENT
Start: 2017-09-29 | End: 2017-10-20

## 2017-09-29 RX ORDER — IBUPROFEN 400 MG/1
800 TABLET, FILM COATED ORAL ONCE
Status: COMPLETED | OUTPATIENT
Start: 2017-09-29 | End: 2017-09-29

## 2017-09-29 RX ORDER — IBUPROFEN 600 MG/1
600 TABLET, FILM COATED ORAL EVERY 8 HOURS PRN
Qty: 60 TABLET | Refills: 0 | Status: SHIPPED | OUTPATIENT
Start: 2017-09-29 | End: 2017-10-20

## 2017-09-29 RX ORDER — IOPAMIDOL 755 MG/ML
68 INJECTION, SOLUTION INTRAVASCULAR ONCE
Status: COMPLETED | OUTPATIENT
Start: 2017-09-29 | End: 2017-09-29

## 2017-09-29 RX ADMIN — KETOROLAC TROMETHAMINE 30 MG: 30 INJECTION, SOLUTION INTRAMUSCULAR at 17:03

## 2017-09-29 RX ADMIN — IOPAMIDOL 68 ML: 755 INJECTION, SOLUTION INTRAVENOUS at 15:57

## 2017-09-29 RX ADMIN — SODIUM CHLORIDE 92 ML: 9 INJECTION, SOLUTION INTRAVENOUS at 15:57

## 2017-09-29 RX ADMIN — IBUPROFEN 800 MG: 400 TABLET ORAL at 01:46

## 2017-09-29 RX ADMIN — CYCLOBENZAPRINE HYDROCHLORIDE 10 MG: 10 TABLET, FILM COATED ORAL at 01:46

## 2017-09-29 ASSESSMENT — ENCOUNTER SYMPTOMS
COUGH: 0
HEADACHES: 0
NAUSEA: 0
SHORTNESS OF BREATH: 0
SPEECH DIFFICULTY: 0
VOMITING: 0
WEAKNESS: 0
DIAPHORESIS: 0
NUMBNESS: 0
SHORTNESS OF BREATH: 0
BACK PAIN: 1
FEVER: 0
NECK PAIN: 1
FACIAL ASYMMETRY: 0

## 2017-09-29 NOTE — ED AVS SNAPSHOT
Emergency Department    [address] [place] [address]    Phone:  [phone]    Fax:  [phone]                                       Michael S Rosler   MRN: [id number]    Department:   Emergency Department   Date of Visit:  9/29/2017           After Visit Summary Signature Page     I have received my discharge instructions, and my questions have been answered. I have discussed any challenges I see with this plan with the nurse or doctor.    ..........................................................................................................................................  Patient/Patient Representative Signature      ..........................................................................................................................................  Patient Representative Print Name and Relationship to Patient    ..................................................               ................................................  Date                                            Time    ..........................................................................................................................................  Reviewed by Signature/Title    ...................................................              ..............................................  Date                                                            Time

## 2017-09-29 NOTE — ED PROVIDER NOTES
"  History     Chief Complaint:  Neck Pain       HPI   Michael S Rosler is a 56 year old male with a history of hyperlipidemia, migraines, and anxiety who presents to the emergency department for evaluation of neck pain. The patient states that he was generally feeling well today and went to lay down to go to bed approximately one hour ago when he developed sudden onset, atraumatic lower neck and upper right back/shoulder discomfort, which has been persistent since onset. This sudden onset of pain was concerning to him and prompted his ED visit today.     The patient has not taken any medication for this pain. He denies any recent fevers, headache, vision changes, facial numbness or tingling, speech changes, extremity numbness or weakness, shortness of breath, bladder or bowel incontinence, or personal history of IV drug use. The patient notes that he exercises regularly and lifts weights normally, though denies any recent changes in his exercise routine or new lifting/injury.     Allergies:  No Known Allergies     Medications:    Lipitor  Multivitamins    Past Medical History:    hyperlipidemia  Migraines  Kidney stones  Anxiety  Insomnia  Keratosis seborrheica    Past Surgical History:    Bilateral hernia repair  Vasectomy    Family History:    COPD  Neurological disorder  Brain Tumor    Social History:  Presents alone.  Negative for tobacco use.  Positive for alcohol use, occasionally.   Marital Status:   [4]    Review of Systems   Eyes: Negative for visual disturbance.   Respiratory: Negative for shortness of breath.    Musculoskeletal: Positive for back pain (Upper) and neck pain.   Neurological: Negative for facial asymmetry, speech difficulty, weakness, numbness and headaches.   All other systems reviewed and are negative.    Physical Exam   First Vitals:  BP: (!) 162/98  Heart Rate: 63  Temp: 96.9  F (36.1  C)  Resp: 18  Height: 180.3 cm (5' 11\")  Weight: 80.3 kg (177 lb)  SpO2: 99 %      Physical " Exam  Patient appears comfortable at rest.  HEENT: Normal. Mucous membranes are moist, no nasal discharge.  Eyes: Normal, equal pupils.  Musculoskeletal: No midline cervical or thoracic tenderness. He has pain over the lower trapezius muscle above the scapular spine on the right. There is a knot in the muscle with some crepitus when I push. This reproduces his pain. No tenderness on the left.  Cardiovascular: Strong carotid pulses, normal radial pulses in both wrists.  Neuro: Awake and alert. Cranial nerves are intact, normal sensation and strength in his upper extremities.    Emergency Department Course   Interventions:  0146 ibuprofen 800 mg PO   Flexeril 10 mg PO    Emergency Department Course:  Nursing notes and vitals reviewed. 0129 I performed an exam of the patient as documented above.     Findings and plan explained to the Patient. Patient discharged home with instructions regarding supportive care, medications, and reasons to return. The importance of close follow-up was reviewed. The patient was prescribed Flexeril.     Impression & Plan    Medical Decision Making:  Patient comes in with right-sided posterior shoulder pain. He has a trigger point on exam and a knot in the muscle. I could not localize the area of tenderness. No pain along the spine. His neurologic exam is normal, no history of vascular disease. He has had problems in the past on the left side in the same area, but this is the first time on the right. He was given Motrin 800 mg orally and 10 mg of Flexeril for the spasm. He has some pain medication from his previous kidney stone at home that he can take if needed at night to help him sleep. I would recommend some physical therapy for this area, which I believe would help. No fevers to suggest an infectious etiology and I do not feel he needs imaging or further workup at this time.    Diagnosis:    ICD-10-CM   1. Trigger point of right shoulder region, trapezius muscle M25.511      Disposition:  discharged to home. Ice to your shoulder, massage and consider physical therapy or your chiropractor. Ibuprofen 800 every 6 hours as needed, Flexeril as needed for spasm. You can use your home pain medication as needed. Follow-up with your doctor in the clinic next week if not resolving.  Discharge Medications:  New Prescriptions    CYCLOBENZAPRINE (FLEXERIL) 10 MG TABLET    Take 1 tablet (10 mg) by mouth 3 times daily as needed for muscle spasms     IElvia, am serving as a scribe on 9/29/2017 at 1:29 AM to personally document services performed by Chen Sears MD based on my observations and the provider's statements to me.     Elvia Reyna  9/29/2017    EMERGENCY DEPARTMENT       Chen Sears MD  09/29/17 0413

## 2017-09-29 NOTE — DISCHARGE INSTRUCTIONS
Ice to your shoulder, massage and consider physical therapy or your chiropractor. Ibuprofen 800 every 6 hours as needed, Flexeril as needed for spasm. You can use your home pain medication as needed. Follow-up with your doctor in the clinic next week if not resolving.

## 2017-09-29 NOTE — ED AVS SNAPSHOT
Emergency Department    [address] [place] [address]    Phone:  [phone]    Fax:  [phone]                                       Michael S Rosler   MRN: [id number]    Department:   Emergency Department   Date of Visit:  9/29/2017           After Visit Summary Signature Page     I have received my discharge instructions, and my questions have been answered. I have discussed any challenges I see with this plan with the nurse or doctor.    ..........................................................................................................................................  Patient/Patient Representative Signature      ..........................................................................................................................................  Patient Representative Print Name and Relationship to Patient    ..................................................               ................................................  Date                                            Time    ..........................................................................................................................................  Reviewed by Signature/Title    ...................................................              ..............................................  Date                                                            Time

## 2017-09-29 NOTE — ED AVS SNAPSHOT
Emergency Department    [address] [facility] [address]    Phone:  [phone]    Fax:  [phone]                                       Michael S Rosler   MRN: [id number]    Department:   Emergency Department   Date of Visit:  9/29/2017           Patient Information     Date Of Birth          1960        Your diagnoses for this visit were:     Pericarditis, unspecified chronicity, unspecified type     Heart murmur        You were seen by Ankit Romo MD.      Follow-up Information     Follow up with Mauricio Chowdary MD In 5 days.    Specialty:  Family Practice    Contact information:    [place]  [address]  [facility] [address] [phone]          Follow up with  Emergency Department.    Specialty:  EMERGENCY MEDICINE    Why:  As needed, If symptoms worsen    Contact information:    [address] [facility] [id number] [phone]        Discharge Instructions           Understanding a Heart Murmur    The heart makes sounds as it beats. These sounds occur as the heart valves open and close to allow blood to flow through the heart. A heart murmur is an extra noise heard during a heartbeat. The noise is caused when blood does not flow smoothly through the heart. Heart murmurs can be innocent (harmless) or abnormal (caused by a heart problem).  What causes a heart murmur?  An innocent heart murmur can be a normal finding for many people. It may also be caused by:    Fever    Exercise    Pregnancy    Anemia    Overactive thyroid gland  An abnormal heart murmur can be caused by heart problems such as:    A damaged or diseased valve. The valve may be too narrow for blood to flow through easily. Or it may have problems opening or closing, and may leak blood backward.    A hole in the heart (septal defect). This is a problem with the heart s structure that a person is born with (congenital). It causes blood to leak through the wall that normally  divides the left and right sides of the heart.  What are the symptoms of a heart murmur?  Heart murmurs do not usually cause symptoms. They tend to be found when your healthcare provider is listening to your heart for another reason. People with an abnormal heart murmur may have symptoms of the problem causing the murmur. Symptoms can include:    Feeling weak or tired    Shortness of breath, especially with exercise    Chest pain    Fast, pounding, or skipping heartbeat    Swollen ankles, feet, abdomen    Feeling dizzy or faint    Poor feeding and failing to grow normally (babies only)  How is a heart murmur treated?  An innocent heart murmur does not usually need treatment unless there is a clear cause, such as anemia. In such cases, treating the underlying cause should cure the murmur. In some cases, an innocent heart murmur may go away on its own.  Treatment for an abnormal heart murmur depends on the cause. Options may include:    Medicines to help relieve symptoms    Procedures or surgery to fix or replace a diseased or damaged heart valve    Procedures or surgery to fix a hole in the heart  What are the complications of a heart murmur?  An innocent heart murmur has no complications. Complications of an abnormal heart murmur will vary depending on the cause. Possible complications include:    Heart failure. This problem occurs when the heart is so weak it no longer pumps blood well.    Infection of the heart s valves or inner lining (infective endocarditis)    Blood clots and stroke    Fainting    Heart attack    Sudden cardiac arrest. This problem occurs when the heart suddenly stops beating.  When should I call my healthcare provider?  Call your healthcare provider right away if you have any of these:    Chest pain    Shortness of breath    Fever of 100.4 F (38 C) or higher, or as directed    Symptoms that don t get better with treatment, or symptoms that get worse    New symptoms   Date Last Reviewed:  5/1/2016 2000-2017 [employer]. [address] [place] [address], [place] [address]. All rights reserved. This information is not intended as a substitute for professional medical care. Always follow your healthcare professional's instructions.        Pericarditis    Pericarditis is inflammation of the pericardium, the thin sac (membrane) that surrounds the heart.  The pericardium holds the heart in place and helps it work properly. There is a small amount of fluid between the inner and outer layers of the pericardium. This fluid keeps the layers from rubbing as the heart moves to pump blood. Pericarditis may last for 2 to 6 weeks.  Home care  Follow these guidelines when caring for yourself at home:    It s important to rest until you feel better. Don t do any strenuous activity during this time.    You may use ibuprofen or naproxen to control pain, unless another medicine was prescribed. If you have chronic liver or kidney disease, talk with your healthcare provider before using these medicines. Also talk with your provider if you ve had a stomach ulcer or gastrointestinal bleeding. Acetaminophen may not help this type of pain as much as ibuprofen or naproxen.  Follow-up care  Follow up with your healthcare provider, or as advised. Also call your provider if your symptoms don t get better in 1 week, or if they last more than 2 weeks.  When to seek medical advice  Call your healthcare provider right away if any of these occur:    A change in the type of pain. This means if it feels different, becomes more severe, lasts longer, or begins to spread into your shoulder, arm, neck, jaw, or back.    Shortness of breath or more pain with breathing    Weakness, dizziness, or fainting    Cough with dark-colored phlegm (sputum) or blood    Fever of 100.4 F (38 C) or higher, or as directed by your healthcare provider    Swelling in a leg    Rapid pulse rate that does not go away with rest  Date Last Reviewed:  6/1/2016 2000-2017 [employer]. [address] [place] [address], [place] [address]. All rights reserved. This information is not intended as a substitute for professional medical care. Always follow your healthcare professional's instructions.          Future Appointments        Provider Department Dept Phone Center    10/13/2017 8:00 AM Jean Peoples MD [facility] [phone] [facility]      24 Hour Appointment Hotline       To make an appointment at any [facility], call [phone] ([phone]). If you don't have a family doctor or clinic, we will help you find one. [place] clinics are conveniently located to serve the needs of you and your family.          ED Discharge Orders     Echocardiogram       Administration of IV contrast will be tailored to this examination per the appropriate written protocol listed in the Echocardiography department Protocol Book, or by the supervising Cardiologist. This may result in an order change.    Use of contrast is at the discretion of the supervising Cardiologist.            Follow-Up with Cardiologist                    Review of your medicines      START taking        Dose / Directions Last dose taken    colchicine 0.6 MG tablet   Dose:  0.6 mg   Quantity:  14 tablet        Take 1 tablet (0.6 mg) by mouth daily for 14 days   Refills:  0        ibuprofen 600 MG tablet   Commonly known as:  ADVIL/MOTRIN   Dose:  600 mg   Quantity:  60 tablet        Take 1 tablet (600 mg) by mouth every 8 hours as needed for mild pain   Refills:  0          Our records show that you are taking the medicines listed below. If these are incorrect, please call your family doctor or clinic.        Dose / Directions Last dose taken    cyclobenzaprine 10 MG tablet   Commonly known as:  FLEXERIL   Dose:  10 mg   Quantity:  15 tablet        Take 1 tablet (10 mg) by mouth 3 times daily as needed for muscle spasms   Refills:  0        LIPITOR PO   Dose:  40  mg        Take 40 mg by mouth At Bedtime   Refills:  0        MULTIVITAMIN ADULTS PO        Take by mouth daily   Refills:  0                Prescriptions were sent or printed at these locations (2 Prescriptions)                   Other Prescriptions                Printed at Department/Unit printer (2 of 2)         colchicine 0.6 MG tablet               ibuprofen (ADVIL/MOTRIN) 600 MG tablet                Procedures and tests performed during your visit     Procedure/Test Number of Times Performed    Basic metabolic panel 1    CBC with platelets differential 1    CRP inflammation 1    CT Chest Pulmonary Embolism w Contrast 1    EKG 12 lead 1    Erythrocyte sedimentation rate auto 1    Hepatic panel 1    Lipase 1    Troponin I 2      Orders Needing Specimen Collection     None      Pending Results     Date and Time Order Name Status Description    9/29/2017 1525 CT Chest Pulmonary Embolism w Contrast Preliminary             Pending Culture Results     No orders found from 9/27/2017 to 9/30/2017.            Pending Results Instructions     If you had any lab results that were not finalized at the time of your Discharge, you can call the ED Lab Result RN at [phone]. You will be contacted by this team for any positive Lab results or changes in treatment. The nurses are available 7 days a week from 10A to 6:30P.  You can leave a message 24 hours per day and they will return your call.        Test Results From Your Hospital Stay        9/29/2017  3:17 PM      Component Results     Component Value Ref Range & Units Status    WBC 9.3 4.0 - 11.0 10e9/L Final    RBC Count 4.76 4.4 - 5.9 10e12/L Final    Hemoglobin 14.4 13.3 - 17.7 g/dL Final    Hematocrit 43.2 40.0 - 53.0 % Final    MCV 91 78 - 100 fl Final    MCH 30.3 26.5 - 33.0 pg Final    MCHC 33.3 31.5 - 36.5 g/dL Final    RDW 12.9 10.0 - 15.0 % Final    Platelet Count 195 150 - 450 10e9/L Final    Diff Method Automated Method  Final    % Neutrophils 68.1 % Final     % Lymphocytes 14.7 % Final    % Monocytes 15.9 % Final    % Eosinophils 0.6 % Final    % Basophils 0.1 % Final    % Immature Granulocytes 0.6 % Final    Absolute Neutrophil 6.4 1.6 - 8.3 10e9/L Final    Absolute Lymphocytes 1.4 0.8 - 5.3 10e9/L Final    Absolute Monocytes 1.5 (H) 0.0 - 1.3 10e9/L Final    Absolute Eosinophils 0.1 0.0 - 0.7 10e9/L Final    Absolute Basophils 0.0 0.0 - 0.2 10e9/L Final    Abs Immature Granulocytes 0.1 0 - 0.4 10e9/L Final         9/29/2017  3:09 PM      Component Results     Component Value Ref Range & Units Status    Sodium 141 133 - 144 mmol/L Final    Potassium 4.3 3.4 - 5.3 mmol/L Final    Chloride 106 94 - 109 mmol/L Final    Carbon Dioxide 28 20 - 32 mmol/L Final    Anion Gap 7 3 - 14 mmol/L Final    Glucose 90 70 - 99 mg/dL Final    Urea Nitrogen 23 7 - 30 mg/dL Final    Creatinine 1.07 0.66 - 1.25 mg/dL Final    GFR Estimate 71 >60 mL/min/1.7m2 Final    Non  GFR Calc    GFR Estimate If Black 86 >60 mL/min/1.7m2 Final    African American GFR Calc    Calcium 9.2 8.5 - 10.1 mg/dL Final         9/29/2017  3:14 PM      Component Results     Component Value Ref Range & Units Status    Troponin I ES <0.015 0.000 - 0.045 ug/L Final    The 99th percentile for upper reference range is 0.045 ug/L.  Troponin values   in the range of 0.045 - 0.120 ug/L may be associated with risks of adverse   clinical events.           9/29/2017  4:40 PM      Narrative     CT CHEST WITH CONTRAST   9/29/2017 4:28 PM     HISTORY: Chest and back pain.    COMPARISON: 1/26/2015-CT abdomen and pelvis.    TECHNIQUE: Following the uneventful administration of 68 mL Isovue-370  intravenous contrast, helical sections were acquired through the lungs  according to the pulmonary embolism protocol. Coronal reconstructions  were generated. Radiation dose for this scan was reduced using  automated exposure control, adjustment of the mA and/or kV according  to the patient's size, or iterative  reconstruction technique.    FINDINGS: No visualized pulmonary embolus. The thoracic aorta is  normal in caliber without dissection.    A few linear opacities in the periphery of the lung bases likely  represent atelectasis. Tiny calcified granuloma in the left lower  lobe. The lungs are otherwise clear. No pleural or pericardial  effusion. No enlarged lymph nodes in the chest. Probable very small  hiatal hernia.    Scans through the upper abdomen is significant for a small calcified  granuloma in the spleen and, 2 small nonobstructing right renal  calculi, and a few bilateral peripelvic renal cysts.        Impression     IMPRESSION:   1. No visualized pulmonary embolus.  2. The thoracic aorta is normal in caliber without dissection.  3. No evidence of active pulmonary disease.          9/29/2017  4:21 PM      Component Results     Component Value Ref Range & Units Status    Bilirubin Direct 0.2 0.0 - 0.2 mg/dL Final    Bilirubin Total 0.5 0.2 - 1.3 mg/dL Final    Albumin 3.8 3.4 - 5.0 g/dL Final    Protein Total 7.5 6.8 - 8.8 g/dL Final    Alkaline Phosphatase 82 40 - 150 U/L Final    ALT 49 0 - 70 U/L Final    AST 18 0 - 45 U/L Final         9/29/2017  4:06 PM      Component Results     Component Value Ref Range & Units Status    CRP Inflammation 44.7 (H) 0.0 - 8.0 mg/L Final         9/29/2017  4:21 PM      Component Results     Component Value Ref Range & Units Status    Lipase 114 73 - 393 U/L Final         9/29/2017  4:48 PM      Component Results     Component Value Ref Range & Units Status    Sed Rate 10 0 - 20 mm/h Final         9/29/2017  5:36 PM      Component Results     Component Value Ref Range & Units Status    Troponin I ES <0.015 0.000 - 0.045 ug/L Final    The 99th percentile for upper reference range is 0.045 ug/L.  Troponin values   in the range of 0.045 - 0.120 ug/L may be associated with risks of adverse   clinical events.                  Clinical Quality Measure: Blood Pressure Screening      Your blood pressure was checked while you were in the emergency department today. The last reading we obtained was  BP: 126/84 . Please read the guidelines below about what these numbers mean and what you should do about them.  If your systolic blood pressure (the top number) is less than 120 and your diastolic blood pressure (the bottom number) is less than 80, then your blood pressure is normal. There is nothing more that you need to do about it.  If your systolic blood pressure (the top number) is 120-139 or your diastolic blood pressure (the bottom number) is 80-89, your blood pressure may be higher than it should be. You should have your blood pressure rechecked within a year by a primary care provider.  If your systolic blood pressure (the top number) is 140 or greater or your diastolic blood pressure (the bottom number) is 90 or greater, you may have high blood pressure. High blood pressure is treatable, but if left untreated over time it can put you at risk for heart attack, stroke, or kidney failure. You should have your blood pressure rechecked by a primary care provider within the next 4 weeks.  If your provider in the emergency department today gave you specific instructions to follow-up with your doctor or provider even sooner than that, you should follow that instruction and not wait for up to 4 weeks for your follow-up visit.        Thank you for choosing [facility]       Thank you for choosing [facility] for your care. Our goal is always to provide you with excellent care. Hearing back from our patients is one way we can continue to improve our services. Please take a few minutes to complete the written survey that you may receive in the mail after you visit with us. Thank you!        [facility] Information     [facility] gives you secure access to your electronic health record. If you see a primary care provider, you can also send messages to your care team and make appointments. If you have questions, please  call your primary care clinic.  If you do not have a primary care provider, please call [phone] and they will assist you.        Care EveryWhere ID     This is your Care EveryWhere ID. This could be used by other organizations to access your [place] medical records  [id number]        Equal Access to Services     MISTI GO : Christianne Zazueta, tono harman, winston schreiberalnica khalil. So [facility] [phone].    ATENCIÓN: Si habla español, tiene a molina disposición servicios gratuitos de asistencia lingüística. Llame al [phone].    We comply with applicable federal civil rights laws and [place] laws. We do not discriminate on the basis of race, color, national origin, age, disability, sex, sexual orientation, or gender identity.            After Visit Summary       This is your record. Keep this with you and show to your community pharmacist(s) and doctor(s) at your next visit.

## 2017-09-29 NOTE — ED NOTES
Patient states that he has pinpoint neck pain just to right of spine at base of neck. Patient denies trauma/injury states it started just as he was laying down to bed. Patient states it feels like kidney stone pain in intensity, states pain is sharp in nature no radiation, no increase in pain with movement of neck. Patient states he feels it increase slightly by elevation of right arm.  Patient has no arm drift, no facial droop.

## 2017-09-29 NOTE — DISCHARGE INSTRUCTIONS
Understanding a Heart Murmur    The heart makes sounds as it beats. These sounds occur as the heart valves open and close to allow blood to flow through the heart. A heart murmur is an extra noise heard during a heartbeat. The noise is caused when blood does not flow smoothly through the heart. Heart murmurs can be innocent (harmless) or abnormal (caused by a heart problem).  What causes a heart murmur?  An innocent heart murmur can be a normal finding for many people. It may also be caused by:    Fever    Exercise    Pregnancy    Anemia    Overactive thyroid gland  An abnormal heart murmur can be caused by heart problems such as:    A damaged or diseased valve. The valve may be too narrow for blood to flow through easily. Or it may have problems opening or closing, and may leak blood backward.    A hole in the heart (septal defect). This is a problem with the heart s structure that a person is born with (congenital). It causes blood to leak through the wall that normally divides the left and right sides of the heart.  What are the symptoms of a heart murmur?  Heart murmurs do not usually cause symptoms. They tend to be found when your healthcare provider is listening to your heart for another reason. People with an abnormal heart murmur may have symptoms of the problem causing the murmur. Symptoms can include:    Feeling weak or tired    Shortness of breath, especially with exercise    Chest pain    Fast, pounding, or skipping heartbeat    Swollen ankles, feet, abdomen    Feeling dizzy or faint    Poor feeding and failing to grow normally (babies only)  How is a heart murmur treated?  An innocent heart murmur does not usually need treatment unless there is a clear cause, such as anemia. In such cases, treating the underlying cause should cure the murmur. In some cases, an innocent heart murmur may go away on its own.  Treatment for an abnormal heart murmur depends on the cause. Options may include:    Medicines  to help relieve symptoms    Procedures or surgery to fix or replace a diseased or damaged heart valve    Procedures or surgery to fix a hole in the heart  What are the complications of a heart murmur?  An innocent heart murmur has no complications. Complications of an abnormal heart murmur will vary depending on the cause. Possible complications include:    Heart failure. This problem occurs when the heart is so weak it no longer pumps blood well.    Infection of the heart s valves or inner lining (infective endocarditis)    Blood clots and stroke    Fainting    Heart attack    Sudden cardiac arrest. This problem occurs when the heart suddenly stops beating.  When should I call my healthcare provider?  Call your healthcare provider right away if you have any of these:    Chest pain    Shortness of breath    Fever of 100.4 F (38 C) or higher, or as directed    Symptoms that don t get better with treatment, or symptoms that get worse    New symptoms   Date Last Reviewed: 5/1/2016 2000-2017 [employer]. [address] [place] [address]. All rights reserved. This information is not intended as a substitute for professional medical care. Always follow your healthcare professional's instructions.        Pericarditis    Pericarditis is inflammation of the pericardium, the thin sac (membrane) that surrounds the heart.  The pericardium holds the heart in place and helps it work properly. There is a small amount of fluid between the inner and outer layers of the pericardium. This fluid keeps the layers from rubbing as the heart moves to pump blood. Pericarditis may last for 2 to 6 weeks.  Home care  Follow these guidelines when caring for yourself at home:    It s important to rest until you feel better. Don t do any strenuous activity during this time.    You may use ibuprofen or naproxen to control pain, unless another medicine was prescribed. If you have chronic liver or kidney disease, talk with  your healthcare provider before using these medicines. Also talk with your provider if you ve had a stomach ulcer or gastrointestinal bleeding. Acetaminophen may not help this type of pain as much as ibuprofen or naproxen.  Follow-up care  Follow up with your healthcare provider, or as advised. Also call your provider if your symptoms don t get better in 1 week, or if they last more than 2 weeks.  When to seek medical advice  Call your healthcare provider right away if any of these occur:    A change in the type of pain. This means if it feels different, becomes more severe, lasts longer, or begins to spread into your shoulder, arm, neck, jaw, or back.    Shortness of breath or more pain with breathing    Weakness, dizziness, or fainting    Cough with dark-colored phlegm (sputum) or blood    Fever of 100.4 F (38 C) or higher, or as directed by your healthcare provider    Swelling in a leg    Rapid pulse rate that does not go away with rest  Date Last Reviewed: 6/1/2016 2000-2017 [employer]. [address] [place] [address], [place] [address]. All rights reserved. This information is not intended as a substitute for professional medical care. Always follow your healthcare professional's instructions.

## 2017-09-29 NOTE — ED AVS SNAPSHOT
Emergency Department    [address] [facility] [address]    Phone:  [phone]    Fax:  [phone]                                       Michael S Rosler   MRN: [id number]    Department:   Emergency Department   Date of Visit:  9/29/2017           Patient Information     Date Of Birth          1960        Your diagnoses for this visit were:     Trigger point of right shoulder region trapezius muscle       You were seen by Chen Sears MD.      Follow-up Information     Schedule an appointment as soon as possible for a visit with Mauricio Chowdary MD.    Specialty:  Family Practice    Why:  As needed    Contact information:    [facility]  [address]  [facility] [address] [phone]          Discharge Instructions       Ice to your shoulder, massage and consider physical therapy or your chiropractor. Ibuprofen 800 every 6 hours as needed, Flexeril as needed for spasm. You can use your home pain medication as needed. Follow-up with your doctor in the clinic next week if not resolving.        Discharge References/Attachments     MUSCLE SPASM (ENGLISH)      Future Appointments        Provider Department Dept Phone Center    10/13/2017 8:00 AM Jean Peoples MD [facility] [phone] [facility]      24 Hour Appointment Hotline       To make an appointment at any [facility], call [phone] ([phone]). If you don't have a family doctor or clinic, we will help you find one. [place] clinics are conveniently located to serve the needs of you and your family.             Review of your medicines      START taking        Dose / Directions Last dose taken    cyclobenzaprine 10 MG tablet   Commonly known as:  FLEXERIL   Dose:  10 mg   Quantity:  15 tablet        Take 1 tablet (10 mg) by mouth 3 times daily as needed for muscle spasms   Refills:  0          Our records show that you are taking the medicines listed below. If these are incorrect,  please call your family doctor or clinic.        Dose / Directions Last dose taken    LIPITOR PO   Dose:  40 mg        Take 40 mg by mouth At Bedtime   Refills:  0        MULTIVITAMIN ADULTS PO        Take by mouth daily   Refills:  0                Prescriptions were sent or printed at these locations (1 Prescription)                   Other Prescriptions                Printed at Department/Unit printer (1 of 1)         cyclobenzaprine (FLEXERIL) 10 MG tablet                Orders Needing Specimen Collection     None      Pending Results     No orders found from 9/27/2017 to 9/30/2017.            Pending Culture Results     No orders found from 9/27/2017 to 9/30/2017.            Pending Results Instructions     If you had any lab results that were not finalized at the time of your Discharge, you can call the ED Lab Result RN at [phone]. You will be contacted by this team for any positive Lab results or changes in treatment. The nurses are available 7 days a week from 10A to 6:30P.  You can leave a message 24 hours per day and they will return your call.        Test Results From Your Hospital Stay               Clinical Quality Measure: Blood Pressure Screening     Your blood pressure was checked while you were in the emergency department today. The last reading we obtained was  BP: (!) 162/98 . Please read the guidelines below about what these numbers mean and what you should do about them.  If your systolic blood pressure (the top number) is less than 120 and your diastolic blood pressure (the bottom number) is less than 80, then your blood pressure is normal. There is nothing more that you need to do about it.  If your systolic blood pressure (the top number) is 120-139 or your diastolic blood pressure (the bottom number) is 80-89, your blood pressure may be higher than it should be. You should have your blood pressure rechecked within a year by a primary care provider.  If your systolic blood pressure (the  top number) is 140 or greater or your diastolic blood pressure (the bottom number) is 90 or greater, you may have high blood pressure. High blood pressure is treatable, but if left untreated over time it can put you at risk for heart attack, stroke, or kidney failure. You should have your blood pressure rechecked by a primary care provider within the next 4 weeks.  If your provider in the emergency department today gave you specific instructions to follow-up with your doctor or provider even sooner than that, you should follow that instruction and not wait for up to 4 weeks for your follow-up visit.        Thank you for choosing [facility]       Thank you for choosing [facility] for your care. Our goal is always to provide you with excellent care. Hearing back from our patients is one way we can continue to improve our services. Please take a few minutes to complete the written survey that you may receive in the mail after you visit with us. Thank you!        [employer] Information     [employer] gives you secure access to your electronic health record. If you see a primary care provider, you can also send messages to your care team and make appointments. If you have questions, please call your primary care clinic.  If you do not have a primary care provider, please call [phone] and they will assist you.        Care EveryWhere ID     This is your Care EveryWhere ID. This could be used by other organizations to access your [facility] medical records  [id number]        Equal Access to Services     MISTI GO : Hadii phil Zazueta, waasadda kimani, qaybta kaaldrew alarcon, nica sanchez. So [facility] [phone].    ATENCIÓN: Si habla español, tiene a molina disposición servicios gratuitos de asistencia lingüística. Llame al [phone].    We comply with applicable federal civil rights laws and [place] laws. We do not discriminate on the basis of race, color, national origin, age,  disability sex, sexual orientation or gender identity.            After Visit Summary       This is your record. Keep this with you and show to your community pharmacist(s) and doctor(s) at your next visit.

## 2017-09-29 NOTE — ED PROVIDER NOTES
History     Chief Complaint:  Chest pain    HPI   Michael S Rosler is a 56 year old male who presents with chest pain. The patient was seen here in the ED last evening after onset of right sided neck pain that made sleeping comfortably difficult. He was given Ibuprofen and a muscle relaxer which he states alleviated this problem. Upon leaving the ED he began noticing some mid sternal chest pain. This continued when he awoke this morning and has persisted since that time, which has prompted his current visit to the ED. He states he has never had a similar chest pain to this, and that it worsens somewhat when he lies flat. This is non radiating pain. While he denies any history of acid reflux, he does note he has been seen by both cardiology and ENT in the last 3 months. He has been experiencing a rapid heart rate sensation just prior to going to sleep, as well as a persistent sore throat. Evaluation for his cardiac symptoms included wearing a Holter Monitor for one month, and no clear etiology was found at that time. While here in the ED this afternoon, the patient denies any diaphoresis, shortness of breath, cough, congestion, or other URI symptoms. He states he has had an EKG performed at [facility] in the past. The patient has no personal or family history of heart complications or blood clots, and denies any recent travel or extended immobility. He has not taken any aspirin prior to arrival here.    Cardiac/PE/DVT Risk Factors:  History of hypertension - No  History of hyperlipidemia - Yes  History of diabetes - No  History of smoking - No  Personal history of PE/DVT - No  Family history of PE/DVT - No  Family history of heart complications - No  Recent travel - No  Recent surgery - No  Other immobilizations - No  Cancer - No    Allergies:  No Known Drug Allergies     Medications:    Flexeril  Lipitor    Past Medical History:    Hyperlipidemia  Insomnia  Keratosis seborrheica  Kidney  "stone  Migraine  Other anxiety states    Past Surgical History:    Hernia repair, inguinal. Bilateral  Shoulder surgery    Family History:    COPD  Neurologic disorder  Brain tumor    Social History:  The patient was accompanied to the ED by his wife.  Smoking Status: No  Smokeless Tobacco: No  Alcohol Use: Yes  Marital Status:        Review of Systems   Constitutional: Negative for diaphoresis and fever.   HENT: Negative for congestion.    Respiratory: Negative for cough and shortness of breath.    Cardiovascular: Positive for chest pain. Negative for leg swelling.   Gastrointestinal: Negative for nausea and vomiting.   All other systems reviewed and are negative.    Physical Exam   Vitals:  Patient Vitals for the past 24 hrs:   BP Temp Temp src Pulse Heart Rate Resp SpO2 Height Weight   09/29/17 1730 126/84 - - - 92 16 96 % - -   09/29/17 1426 (!) 132/95 - - - 98 11 98 % - -   09/29/17 1252 (!) 145/91 97.8  F (36.6  C) Oral 123 123 14 99 % 1.803 m (5' 11\") 81.6 kg (180 lb)     Physical Exam  General: Alert, interactive in mild distress  Head:  Scalp is atraumatic  Eyes:  The pupils are equal, round, and reactive to light    EOM's intact    No scleral icterus  ENT:      Nose:  The external nose is normal  Ears:  External ears are normal  Mouth/Throat: The oropharynx is normal    Mucus membranes are moist      Neck:  Normal range of motion.      There is no rigidity.    Trachea is in the midline         CV:  Tachycardic with regular rhythm    2/6 systolic murmur  Resp:  Breath sounds are clear bilaterally    Non-labored, no retractions or accessory muscle use      GI:  Abdomen is soft, no distension, no tenderness.       MS:  Normal strength in all 4 extremities.  No peripheral edema. 2+ pulses in all extremities.  Skin:  Warm and dry, No rash or lesions noted.  Neuro: Strength 5/5 x4.  Sensation intact  In all 4 extremities.      GCS: 15  Psych:  Awake. Alert.  Normal affect.      Appropriate " interactions.    Emergency Department Course     ECG:  ECG taken at 1255, ECG read at 1510  Sinus tachycardia  Possible lateral infarct, age undetermined  Cannot rule out inferior infarct, age undetermined  Abnormal ECG  Rate 133 bpm. ND interval 134. QRS duration 100. QT/QTc 302/449. P-R-T axes 62 62 45.    Imaging:  Radiology findings were communicated with the patient who voiced understanding of the findings.  CT Chest Pulmonary Embolism w contrast:   1. No visualized pulmonary embolus.  2. The thoracic aorta is normal in caliber without dissection.  3. No evidence of active pulmonary disease.   Per Radiologist.     Laboratory:  Laboratory findings were communicated with the patient who voiced understanding of the findings.  CBC: AWNL. (WBC 9.3, HGB 14.4, )   BMP: AWNL (Creatinine 1.07)  Troponin (Collected 1434): <0.015  Hepatic panel: AWNL  CRP: 44.7 (H)  Lipase: 114  ESR: 10  Troponin (Collected 1705): <0.015    Interventions:  1703 Toradol, 30 mg, IV     Emergency Department Course:  Nursing notes and vitals reviewed.  1510 I had my initial encounter with the patient.  I performed an exam of the patient as documented above.   IV was inserted and blood was drawn for laboratory testing, results above.  The patient was sent for a CT while in the emergency department, results above.   1743 I updated the patient on their plan of care.    I discussed the treatment plan with the patient. They expressed understanding of this plan and consented to discharge. They will be discharged home with instructions for care and follow up. In addition, the patient will return to the emergency department if their symptoms persist, worsen, if new symptoms arise or if there is any concern.  All questions were answered.    Impression & Plan      Medical Decision Making:  Michael S Rosler is a 56 year old male who was seen and evaluated with the above workup undertaken. He presents with back and chest pain that is worse with  lying flat. The above workup was undertaken. EKG is nonischemic. Troponin times two is negative. His CRP is slightly elevated. Given the back and chest discomfort, CT imaging of the chest was performed which demonstrated no evidence of PE, pleural effusion, aortic dissection, pericardial effusion, or more concerning illness. The patient does have a slight murmur on examination, but no sign of endocarditis. I think he is likely experiencing pericarditis. He received Ketorolac here with resolution of his symptoms. I've discharged him with Ibuprofen as well as Colchicine. I've ordered an outpatient echocardiogram as well as cardiac consultation. I've recommended close follow up with his primary care provider as well as return to the ED if new symptoms develop. The patient was in agreement with this plan and was subsequently discharged home.    Diagnosis:    ICD-10-CM    1. Pericarditis, unspecified chronicity, unspecified type I31.9 Follow-Up with Cardiologist     Echocardiogram   2. Heart murmur R01.1 Follow-Up with Cardiologist     Echocardiogram     Disposition:   Discharge    Discharge Medications:  Discharge Medication List as of 9/29/2017  6:00 PM      START taking these medications    Details   colchicine 0.6 MG tablet Take 1 tablet (0.6 mg) by mouth daily for 14 days, Disp-14 tablet, R-0, Local Print      ibuprofen (ADVIL/MOTRIN) 600 MG tablet Take 1 tablet (600 mg) by mouth every 8 hours as needed for mild pain, Disp-60 tablet, R-0, Local Print           Scribe Disclosure:  I, Chino Fernandes, am serving as a scribe at 3:07 PM on 9/29/2017 to document services personally performed by Ankit Romo,*, based on my observations and the provider's statements to me.    9/29/2017    EMERGENCY DEPARTMENT       Ankit Romo MD  09/29/17 8968

## 2017-10-01 ENCOUNTER — NURSE TRIAGE (OUTPATIENT)
Dept: NURSING | Facility: CLINIC | Age: 57
End: 2017-10-01

## 2017-10-01 NOTE — TELEPHONE ENCOUNTER
Patient asking if okay to take ibuprofen, sumatriptan and colchicine together.  FNA advised no interactions per WebMD.

## 2017-10-10 ENCOUNTER — HOSPITAL ENCOUNTER (OUTPATIENT)
Dept: CARDIOLOGY | Facility: CLINIC | Age: 57
Discharge: HOME | End: 2017-10-10
Attending: EMERGENCY MEDICINE | Admitting: EMERGENCY MEDICINE
Payer: COMMERCIAL

## 2017-10-10 DIAGNOSIS — I31.9 PERICARDITIS, UNSPECIFIED CHRONICITY, UNSPECIFIED TYPE: ICD-10-CM

## 2017-10-10 DIAGNOSIS — R01.1 HEART MURMUR: ICD-10-CM

## 2017-10-10 PROCEDURE — 93306 TTE W/DOPPLER COMPLETE: CPT | Mod: 26 | Performed by: INTERNAL MEDICINE

## 2017-10-10 PROCEDURE — 25500064 ZZH RX 255 OP 636: Performed by: EMERGENCY MEDICINE

## 2017-10-10 PROCEDURE — 93306 TTE W/DOPPLER COMPLETE: CPT

## 2017-10-10 RX ADMIN — HUMAN ALBUMIN MICROSPHERES AND PERFLUTREN 2 ML: 10; .22 INJECTION, SOLUTION INTRAVENOUS at 08:28

## 2017-10-13 ENCOUNTER — CARE COORDINATION (OUTPATIENT)
Dept: CARDIOLOGY | Facility: CLINIC | Age: 57
End: 2017-10-13

## 2017-10-13 ENCOUNTER — OFFICE VISIT (OUTPATIENT)
Dept: SLEEP MEDICINE | Facility: CLINIC | Age: 57
End: 2017-10-13
Payer: COMMERCIAL

## 2017-10-13 VITALS
HEART RATE: 104 BPM | DIASTOLIC BLOOD PRESSURE: 81 MMHG | RESPIRATION RATE: 16 BRPM | SYSTOLIC BLOOD PRESSURE: 117 MMHG | HEIGHT: 71 IN | OXYGEN SATURATION: 96 % | WEIGHT: 180.6 LBS | BODY MASS INDEX: 25.28 KG/M2

## 2017-10-13 DIAGNOSIS — I31.9 PERICARDITIS: Primary | ICD-10-CM

## 2017-10-13 DIAGNOSIS — G47.33 OSA (OBSTRUCTIVE SLEEP APNEA): ICD-10-CM

## 2017-10-13 DIAGNOSIS — I31.9 PERICARDITIS, UNSPECIFIED CHRONICITY, UNSPECIFIED TYPE: Primary | ICD-10-CM

## 2017-10-13 PROCEDURE — 99214 OFFICE O/P EST MOD 30 MIN: CPT | Performed by: INTERNAL MEDICINE

## 2017-10-13 NOTE — MR AVS SNAPSHOT
After Visit Summary   10/13/2017    Michael S Rosler    MRN: [id number]           Patient Information     Date Of Birth          1960        Visit Information        Provider Department      10/13/2017 8:00 AM Jean Peoples MD [facility]        Today's Diagnoses     Pericarditis, unspecified chronicity, unspecified type    -  1    RAFAEL (obstructive sleep apnea)          Care Instructions    Talk with your Cardiologist about the chest pain and if you need further treatment.    Separately, when you are ready to get checked to make sure your Obstructive Sleep Apnea is treated, call us to schedule your Home Sleep Apnea Testing.       Weight management plan: Patient was referred to their PCP to discuss a diet and exercise plan.            Follow-ups after your visit        Your next 10 appointments already scheduled     Oct 26, 2017  8:15 AM CDT   Return Visit with Christina Bolivar MD   [facility] ([facility])    [address] [place] [id number] [phone]              Future tests that were ordered for you today     Open Future Orders        Priority Expected Expires Ordered    HST-Home Sleep Apnea Test Routine  4/14/2018 10/13/2017            Who to contact     If you have questions or need follow up information about today's clinic visit or your schedule please contact [facility] directly at [phone].  Normal or non-critical lab and imaging results will be communicated to you by MyChart, letter or phone within 4 business days after the clinic has received the results. If you do not hear from us within 7 days, please contact the clinic through MyChart or phone. If you have a critical or abnormal lab result, we will notify you by phone as soon as possible.  Submit refill requests through [employer] or call your pharmacy and they will forward the refill request to us. Please  "allow 3 business days for your refill to be completed.          Additional Information About Your Visit        MyChart Information     [employer] gives you secure access to your electronic health record. If you see a primary care provider, you can also send messages to your care team and make appointments. If you have questions, please call your primary care clinic.  If you do not have a primary care provider, please call [phone] and they will assist you.        Care EveryWhere ID     This is your Care EveryWhere ID. This could be used by other organizations to access your [facility] medical records  [id number]        Your Vitals Were     Pulse Respirations Height Pulse Oximetry BMI (Body Mass Index)       104 16 1.803 m (5' 11\") 96% 25.19 kg/m2        Blood Pressure from Last 3 Encounters:   10/13/17 117/81   09/29/17 126/84   09/29/17 (!) 140/98    Weight from Last 3 Encounters:   10/13/17 81.9 kg (180 lb 9.6 oz)   09/29/17 81.6 kg (180 lb)   09/29/17 80.3 kg (177 lb)               Primary Care Provider Office Phone # Fax #    Mauricio Chowdary [phone]       [facility] [address]  [place] [address]        Equal Access to Services     MISTI GO : Hadii aad ku hadasho Soomaali, waaxda luqadaha, qaybta kaalmada adeegyada, waxay idiin haydavien vern estrella lachristian sanchez. So [facility] [phone].    ATENCIÓN: Si habla español, tiene a molina disposición servicios gratuitos de asistencia lingüística. Llame al [phone].    We comply with applicable federal civil rights laws and [place] laws. We do not discriminate on the basis of race, color, national origin, age, disability, sex, sexual orientation, or gender identity.            Thank you!     Thank you for choosing [facility]  for your care. Our goal is always to provide you with excellent care. Hearing back from our patients is one way we can continue to improve our services. Please take a few minutes to complete the written " survey that you may receive in the mail after your visit with us. Thank you!             Your Updated Medication List - Protect others around you: Learn how to safely use, store and throw away your medicines at www.disposemymeds.org.          This list is accurate as of: 10/13/17  9:01 AM.  Always use your most recent med list.                   Brand Name Dispense Instructions for use Diagnosis    colchicine 0.6 MG tablet     14 tablet    Take 1 tablet (0.6 mg) by mouth daily for 14 days        cyclobenzaprine 10 MG tablet    FLEXERIL    15 tablet    Take 1 tablet (10 mg) by mouth 3 times daily as needed for muscle spasms        ibuprofen 600 MG tablet    ADVIL/MOTRIN    60 tablet    Take 1 tablet (600 mg) by mouth every 8 hours as needed for mild pain        LIPITOR PO      Take 40 mg by mouth At Bedtime        MULTIVITAMIN ADULTS PO      Take by mouth daily        PRAVASTATIN SODIUM PO

## 2017-10-13 NOTE — PROGRESS NOTES
Pt walked into clinic requesting to speak with Dr. Bolivar's nurse.  Met with pt who reports that he was recently in ED and diagnosed with pericarditis and prescribed colchicine and ibuprofen.  Pt reports he only took colchicine X 2 days because his chest pain symptoms improved.  Pt took Ibuprofen daily for 8 or 9 days, but has not taken any Ibuprofen for past few days.  Pt reports he is starting to notice same chest pain symptoms that precipitated his ED visit and subsequent pericarditis diagnosis and is wondering if he should have taken colchicine for full 14 days.      Per chart review, pt was seen in ED on 9/29, diagnosed with pericarditis, given Rx for colchicine 0.6mg daily X 14 days, Ibuprofen 600mg q8hrs PRN, Echo ordered and advised to f/u with cardiology.  Echo was done 10/10/17 and pt is scheduled to f/u with Dr. Bolivar on 10/26/17.      Advised pt to restart colchicine as Rx'd until gone and to continue taking Ibuprofen as Rx'd PRN.  Also advised pt return to ED if chest pain worsens and/or seems different than chest pain he went to ED for on 9/29.  Pt verbalized understanding and agrees with this plan.  Pt states he might have thrown out colchicine.  Advised pt to go home and check and call clinic (provided pt with Dr. Bolivar's card and Dr. Bolivar's nurse phone number) if he cannot find colchicine.  Discussed with pt that will review with Dr. Bolivar to inquire if pt should continue colchicine until he is seen on 10/26/17 and will call back with recommendations, which he understands might not be until sometime next week.  Route to Dr. Bolivar and SHANTELLE Solis for review.  SHANTELLE Nielsen 12:39 PM 10/13/2017

## 2017-10-13 NOTE — PROGRESS NOTES
"  Sleep Consultation:    Date on this visit: 10/13/2017    Primary Physician: Mauricio Chowdary     Michael S Rosler is a 56 year old male presenting with complaints of nocturnal tachycardia symptoms and wondering if Obstructive Sleep Apnea is effectively treated with Mandibular Advancement Device.    He has a history of mild RAFAEL.    4/1/2010 - [facility] Polysomnography - AHI 8.7/hr; RDI 34.1/hr; SpO2 constantin 88%.  Didn't tolerate CPAP.  Using Mandibular Advancement Device made from Dr. PAULA Carney.    Has been having episodes of heart racing during sleep; these events wake him from sleep.   Recently went to the ER for shoulder/chest pain and was diagnosed with pericarditis based on elevated CRP, symptom burden, and improvement with ketorolac.  Only took Colchicine for 3 days as he thinks he was told to just take colchicine until symptoms went away.    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  Reports job is \"fast-paced\" but feels he \"handles it well.\"  Wakes before alarm; up around 6 - 6:30 but alarm goes off at 6:30 AM.  Getting into bed around 10:30 - 11 PM.  Falls asleep quickly.   Weekends may sleep in a little (until around 7 AM).    Sleep Disordered Breathing - Wears mouthguard every night.  No residual snoring.  No snort arousals or witnessed apneas.   Has nocturia once per night.  No nocturnal GERD.    Upon waking feels pretty good.  He denies morning headaches.  No morning dry mouth.  No morning sinus congestion.   Patient was counseled on the importance of driving while alert, to pull over if drowsy, or nap before getting into the vehicle if sleepy.    Movement/Behaviors - No somniloquy.  No somnambulism.  No sleep related eating.  No dream enactment behavior.   Patient denies typical restless legs syndrome symptoms.     He denies bruxism.     Alcohol use - None/Rare  Caffeine intake - no caffeine.  Tobacco exposure - None  Illicit substances - None    Lives alone; .  May stay over at girlfriend's " place frequently.  20-year-old son stays with him on holiday breaks.  Has no pets (GF has a cat).     Does have family history of Obstructive Sleep Apnea in sister.    Allergies:    No Known Allergies    Medications:    Current Outpatient Prescriptions   Medication Sig Dispense Refill     PRAVASTATIN SODIUM PO        cyclobenzaprine (FLEXERIL) 10 MG tablet Take 1 tablet (10 mg) by mouth 3 times daily as needed for muscle spasms 15 tablet 0     colchicine 0.6 MG tablet Take 1 tablet (0.6 mg) by mouth daily for 14 days 14 tablet 0     ibuprofen (ADVIL/MOTRIN) 600 MG tablet Take 1 tablet (600 mg) by mouth every 8 hours as needed for mild pain 60 tablet 0     Multiple Vitamins-Minerals (MULTIVITAMIN ADULTS PO) Take by mouth daily       Atorvastatin Calcium (LIPITOR PO) Take 40 mg by mouth At Bedtime          Problem List:  Patient Active Problem List    Diagnosis Date Noted     RAFAEL (obstructive sleep apnea) 10/13/2017     Priority: Medium     4/1/2010 - [facility] Polysomnography - AHI 8.7/hr; RDI 34.1/hr; SpO2 constantin 88%.  Didn't tolerate CPAP.  Using Mandibular Advancement Device made from Dr. PAULA Carney.       Hyperlipidemia      Priority: Medium     Other anxiety states      Priority: Medium        Past Medical/Surgical History:  Past Medical History:   Diagnosis Date     Hyperlipidemia      Insomnia      Keratosis seborrheica      Kidney stone      Migraine      Other anxiety states      Past Surgical History:   Procedure Laterality Date     HERNIA REPAIR, INGUINAL RT/LT  3/06    bilateral     VASECTOMY         Social History:  Social History     Social History     Marital status:      Spouse name: N/A     Number of children: N/A     Years of education: N/A     Occupational History       [employer]     Social History Main Topics     Smoking status: Never Smoker     Smokeless tobacco: Never Used     Alcohol use Yes      Comment: 1 drink every two months     Drug use: No     Sexual activity:  "Not on file     Other Topics Concern     Not on file     Social History Narrative     Family History:  Family History   Problem Relation Age of Onset     Chronic Obstructive Pulmonary Disease Mother      Neurologic Disorder Sister      Brain Tumor Sister 58     Other - See Comments Father 84     old age     Review of Systems:  A complete review of systems reviewed by me is negative with the exeption of what has been mentioned in the history of present illness.  Migraines.    Physical Examination:  Vitals: /81  Pulse 104  Resp 16  Ht 1.803 m (5' 11\")  Wt 81.9 kg (180 lb 9.6 oz)  SpO2 96%  BMI 25.19 kg/m2  BMI= Body mass index is 25.19 kg/(m^2).    Neck Cir (cm): 40 cm    [place] Total Score 10/13/2017   Total score - [place] 3     General appearance: No apparent distress, well groomed.    HEENT:   Head: Normocephalic, atraumatic.  Neck Cir (cm): 40 cm (10/13/2017  8:00 AM)    Musculoskeletal: No edema noted.  No clubbing or cyanosis.  Skin: Warm, dry, intact.  Neurologic: Alert and oriented to person, place and time   Gait is normal.  Psychiatric: Affect pleasant.  Mood good.     Impression/Plan:  1. RAFAEL (obstructive sleep apnea)  Patient has previously diagnosed mild (AHI-based) vs severe (RDI-based) Obstructive Sleep Apnea.  Never had confirmatory testing after fitting with Mandibular Advancement Device, but given mild Obstructive Sleep Apnea this is not unreasonable. Would recommend Home Sleep Apnea Testing some time in the future to confirm Obstructive Sleep Apnea resolution on treatment.  - HST-Home Sleep Apnea Test; Future    2. Pericarditis, unspecified chronicity, unspecified type  Patient describes nocturnal heart-racing and associated mild chest discomfort.  Suspect pericarditis diagnosis may have been accurate.  However, suspect treatment with colchicine for 3 days would not have been sufficient. I have encouraged pt to see cardiologist sooner rather than later to discuss diagnosis and " recommendations for treatment.  Suspect heart rate issues less likely to be related to Obstructive Sleep Apnea given low symptom burden currently.    Literature provided regarding sleep apnea.      Total time of care was 35 minutes, with > 50% of time spent on counseling and coordination of care.      Jean Peoples MD, Sleep Physician  Oct 13, 2017     CC: No ref. provider found

## 2017-10-13 NOTE — PATIENT INSTRUCTIONS
Talk with your Cardiologist about the chest pain and if you need further treatment.    Separately, when you are ready to get checked to make sure your Obstructive Sleep Apnea is treated, call us to schedule your Home Sleep Apnea Testing.       Weight management plan: Patient was referred to their PCP to discuss a diet and exercise plan.

## 2017-10-16 NOTE — PROGRESS NOTES
Patient called and advised that upon returning home last Friday (10/13/17) he did discover that he still has his previously rx bottle of colchicine. Patient states that the has been taking it since Friday 10/13/17 along with Ibuprofen and has noticed some improvement in his chest pain symptoms. Patient reported to this RN that he will call clinic by Thursday 10/19/17 if he still has persistent symptoms for further recommendations as he will be out of colchicine on 10/20/17. RN advised patient would notify Dr. Bolivar and Dr. Bolivar's RN of plan. RN advised patient to call clinic with any questions or concerns. Patient acknowledged understanding and agreed with plan.

## 2017-10-19 NOTE — PROGRESS NOTES
Call from patient w/update and question for Dr Bolivar, patient complaints of chest pain remain a dull ache only at a level 1 on scale of 1 to 10.   Patient is taking Colchicine 1 tablet daily (has 3 day supply left) and 600 mg ibuprofen twice daily as directed post ER visit 9/29/2017; seen by Primary MD 10/13/2017- see  notes.   Seen by Dr Bolivar 7/27/2017 for rapid heart rate feeling.  Echo 10/10/2017 -EF 60 - 65%. Left ventricular systolic function is normal. There is no pericardial effusion. The ascending aorta is Mildly dilated.  Event monitor with SR/ST.  Patient has visit with Dr Bolivar on 10/26/2017 - Will message Dr Bolivar to review if she would like to renew colchicine.   Debora Weathers RN 10/19/17 3:07 PM

## 2017-10-20 DIAGNOSIS — I31.9 PERICARDITIS: ICD-10-CM

## 2017-10-20 RX ORDER — COLCHICINE 0.6 MG/1
0.6 TABLET ORAL DAILY
Qty: 7 TABLET | Refills: 0 | Status: SHIPPED | OUTPATIENT
Start: 2017-10-20 | End: 2017-10-26

## 2017-10-20 RX ORDER — IBUPROFEN 600 MG/1
600 TABLET, FILM COATED ORAL EVERY 8 HOURS PRN
Qty: 30 TABLET | Refills: 0 | Status: SHIPPED | OUTPATIENT
Start: 2017-10-20 | End: 2017-10-20

## 2017-10-20 RX ORDER — IBUPROFEN 600 MG/1
600 TABLET, FILM COATED ORAL EVERY 8 HOURS PRN
Qty: 30 TABLET | Refills: 0 | Status: SHIPPED | OUTPATIENT
Start: 2017-10-20 | End: 2017-10-26

## 2017-10-20 RX ORDER — COLCHICINE 0.6 MG/1
0.6 TABLET ORAL DAILY
Qty: 7 TABLET | Refills: 0 | Status: SHIPPED | OUTPATIENT
Start: 2017-10-20 | End: 2017-10-20

## 2017-10-20 NOTE — PROGRESS NOTES
Reviewed with DR Bolivar - keep colchicine and ibuprofen unchanged for now and will discuss at visit 10/26/2017, renew RX.  Rx escripted.  Patient informed.  Debora Weathers RN 10/20/17 2:40 PM

## 2017-10-26 ENCOUNTER — OFFICE VISIT (OUTPATIENT)
Dept: CARDIOLOGY | Facility: CLINIC | Age: 57
End: 2017-10-26
Attending: EMERGENCY MEDICINE
Payer: COMMERCIAL

## 2017-10-26 ENCOUNTER — OFFICE VISIT (OUTPATIENT)
Dept: SLEEP MEDICINE | Facility: CLINIC | Age: 57
End: 2017-10-26
Payer: COMMERCIAL

## 2017-10-26 VITALS
BODY MASS INDEX: 25.26 KG/M2 | DIASTOLIC BLOOD PRESSURE: 88 MMHG | OXYGEN SATURATION: 96 % | SYSTOLIC BLOOD PRESSURE: 142 MMHG | HEIGHT: 71 IN | HEART RATE: 90 BPM | WEIGHT: 180.4 LBS

## 2017-10-26 DIAGNOSIS — G47.33 OSA (OBSTRUCTIVE SLEEP APNEA): ICD-10-CM

## 2017-10-26 DIAGNOSIS — I31.9 PERICARDITIS, UNSPECIFIED CHRONICITY, UNSPECIFIED TYPE: ICD-10-CM

## 2017-10-26 LAB
ANION GAP SERPL CALCULATED.3IONS-SCNC: 11.5 MMOL/L (ref 6–17)
BASOPHILS # BLD AUTO: 0 10E9/L (ref 0–0.2)
BASOPHILS NFR BLD AUTO: 0.4 %
BUN SERPL-MCNC: 21 MG/DL (ref 7–30)
CALCIUM SERPL-MCNC: 9.3 MG/DL (ref 8.5–10.5)
CHLORIDE SERPL-SCNC: 105 MMOL/L (ref 98–107)
CO2 SERPL-SCNC: 30 MMOL/L (ref 23–29)
CREAT SERPL-MCNC: 0.93 MG/DL (ref 0.7–1.3)
CRP SERPL-MCNC: 16.9 MG/L (ref 0–8)
DIFFERENTIAL METHOD BLD: NORMAL
EOSINOPHIL # BLD AUTO: 0.4 10E9/L (ref 0–0.7)
EOSINOPHIL NFR BLD AUTO: 5.3 %
ERYTHROCYTE [DISTWIDTH] IN BLOOD BY AUTOMATED COUNT: 12.7 % (ref 10–15)
ERYTHROCYTE [SEDIMENTATION RATE] IN BLOOD BY WESTERGREN METHOD: 23 MM/H (ref 0–20)
GFR SERPL CREATININE-BSD FRML MDRD: 84 ML/MIN/1.7M2
GLUCOSE SERPL-MCNC: 103 MG/DL (ref 70–105)
HCT VFR BLD AUTO: 41.4 % (ref 40–53)
HGB BLD-MCNC: 14 G/DL (ref 13.3–17.7)
IMM GRANULOCYTES # BLD: 0.1 10E9/L (ref 0–0.4)
IMM GRANULOCYTES NFR BLD: 0.7 %
LYMPHOCYTES # BLD AUTO: 1.6 10E9/L (ref 0.8–5.3)
LYMPHOCYTES NFR BLD AUTO: 20.7 %
MCH RBC QN AUTO: 30.9 PG (ref 26.5–33)
MCHC RBC AUTO-ENTMCNC: 33.8 G/DL (ref 31.5–36.5)
MCV RBC AUTO: 91 FL (ref 78–100)
MONOCYTES # BLD AUTO: 0.6 10E9/L (ref 0–1.3)
MONOCYTES NFR BLD AUTO: 7.9 %
NEUTROPHILS # BLD AUTO: 4.9 10E9/L (ref 1.6–8.3)
NEUTROPHILS NFR BLD AUTO: 65 %
NRBC # BLD AUTO: 0 10*3/UL
NRBC BLD AUTO-RTO: 0 /100
PLATELET # BLD AUTO: 257 10E9/L (ref 150–450)
POTASSIUM SERPL-SCNC: 4.5 MMOL/L (ref 3.5–5.1)
RBC # BLD AUTO: 4.53 10E12/L (ref 4.4–5.9)
SODIUM SERPL-SCNC: 142 MMOL/L (ref 136–145)
WBC # BLD AUTO: 7.6 10E9/L (ref 4–11)

## 2017-10-26 PROCEDURE — 99214 OFFICE O/P EST MOD 30 MIN: CPT | Performed by: INTERNAL MEDICINE

## 2017-10-26 PROCEDURE — 93000 ELECTROCARDIOGRAM COMPLETE: CPT | Performed by: INTERNAL MEDICINE

## 2017-10-26 PROCEDURE — 86140 C-REACTIVE PROTEIN: CPT | Performed by: INTERNAL MEDICINE

## 2017-10-26 PROCEDURE — 85025 COMPLETE CBC W/AUTO DIFF WBC: CPT | Performed by: INTERNAL MEDICINE

## 2017-10-26 PROCEDURE — 80048 BASIC METABOLIC PNL TOTAL CA: CPT | Performed by: INTERNAL MEDICINE

## 2017-10-26 PROCEDURE — G0399 HOME SLEEP TEST/TYPE 3 PORTA: HCPCS | Performed by: INTERNAL MEDICINE

## 2017-10-26 PROCEDURE — 36415 COLL VENOUS BLD VENIPUNCTURE: CPT | Performed by: INTERNAL MEDICINE

## 2017-10-26 PROCEDURE — 85652 RBC SED RATE AUTOMATED: CPT | Performed by: INTERNAL MEDICINE

## 2017-10-26 RX ORDER — COLCHICINE 0.6 MG/1
0.6 CAPSULE ORAL
Qty: 60 CAPSULE | Refills: 1 | Status: SHIPPED | OUTPATIENT
Start: 2017-10-26 | End: 2017-12-18

## 2017-10-26 RX ORDER — COLCHICINE 0.6 MG/1
0.6 CAPSULE ORAL
Qty: 60 CAPSULE | Refills: 1 | Status: SHIPPED | OUTPATIENT
Start: 2017-10-26 | End: 2017-10-26

## 2017-10-26 NOTE — PATIENT INSTRUCTIONS
1. Today - blood tests before you leave.    2. Increase Colchicine to 0.6 mg two times daily.    3. Stop Ibuprofen.    4. Avoid vigorous exercise for another 6 weeks.    5. I will see you back in 4 to 6 weeks. Another set of labs before you see me.    If you have any questions or concerns, please call my nurse Debora Saizn at [phone].

## 2017-10-26 NOTE — PROGRESS NOTES
Dictation reference number - [id number]  REFERRING PROVIDER:  Ankit Romo MD  [facility]  [address] [place] [address]    PRIMARY CARE PROVIDER:  Mauricio Chowdary  [facility] [address]  [facility] [address]      REVIEW OF SYSTEMS:  A comprehensive 10-point review of systems was completed and the pertinent positives are documented in the history of present illness.    Skin:  Negative     Eyes:  Positive for glasses  ENT:  Negative    Respiratory:  Positive for sleep apnea  Cardiovascular:    Positive for;palpitations (short episodes of chest pain with exercise)  Gastroenterology: Negative    Genitourinary:  Positive for    Musculoskeletal:  Negative    Neurologic:  Negative    Psychiatric:  Negative    Heme/Lymph/Imm:  Negative    Endocrine:  Negative      CURRENT MEDICATIONS:  Current Outpatient Prescriptions   Medication Sig Dispense Refill     Colchicine (MITIGARE) 0.6 MG CAPS Take 0.6 mg by mouth 2 times daily 60 capsule 1     PRAVASTATIN SODIUM PO Take 40 mg by mouth daily        Multiple Vitamins-Minerals (MULTIVITAMIN ADULTS PO) Take by mouth daily           ALLERGIES:  No Known Allergies    PAST MEDICAL HISTORY:    Past Medical History:   Diagnosis Date     Hyperlipidemia      Insomnia      Keratosis seborrheica      Kidney stone      Migraine      Other anxiety states        PAST SURGICAL HISTORY:    Past Surgical History:   Procedure Laterality Date     HERNIA REPAIR, INGUINAL RT/LT  3/06    bilateral     VASECTOMY         FAMILY HISTORY:    Family History   Problem Relation Age of Onset     Chronic Obstructive Pulmonary Disease Mother      Neurologic Disorder Sister      Brain Tumor Sister 58     Other - See Comments Father 84     old age       SOCIAL HISTORY:    Social History     Social History     Marital status:      Spouse name: N/A     Number of children: N/A     Years of education: N/A     Occupational History       [employer]  "    Social History Main Topics     Smoking status: Never Smoker     Smokeless tobacco: Never Used     Alcohol use Yes      Comment: 1 drink every two months     Drug use: No     Sexual activity: Not Asked     Other Topics Concern     None     Social History Narrative       PHYSICAL EXAM:    Vitals: /88  Pulse 90  Ht 1.803 m (5' 11\")  Wt 81.8 kg (180 lb 6.4 oz)  SpO2 96%  BMI 25.16 kg/m2  Wt Readings from Last 5 Encounters:   10/26/17 81.8 kg (180 lb 6.4 oz)   10/13/17 81.9 kg (180 lb 9.6 oz)   09/29/17 81.6 kg (180 lb)   09/29/17 80.3 kg (177 lb)   07/27/17 81.2 kg (179 lb)           Encounter Diagnosis   Name Primary?     Pericarditis, unspecified chronicity, unspecified type        Orders Placed This Encounter   Procedures     Basic metabolic panel     CBC with platelets differential     CBC with platelets differential     CRP inflammation     Erythrocyte sedimentation rate auto     Follow-Up with Cardiologist     EKG 12-lead complete w/read - Clinics       CC  REFERRING PROVIDER:  Ankit Romo MD  [facility]  [address] [place] [address]                "

## 2017-10-26 NOTE — MR AVS SNAPSHOT
After Visit Summary   10/26/2017    Michael S Rosler    MRN: [id number]           Patient Information     Date Of Birth          1960        Visit Information        Provider Department      10/26/2017 3:00 PM BED 7  [facility]        Today's Diagnoses     RAFAEL (obstructive sleep apnea)           Follow-ups after your visit        Your next 10 appointments already scheduled     Oct 27, 2017  9:15 AM CDT   HST Drop Off with  SLEEP [facility] D[facility]   [facility] ([facility])    [address]  [place] [address]   [phone]            Nov 17, 2017  3:00 PM CST   Return Sleep Patient with Jean Peoples MD   [facility] ([facility])    [address]  [place] [address]   [phone]            Dec 18, 2017 12:50 PM CST   LAB with [facility]   [facility] ([facility])    [address]  [place] [address] [phone]           Patient must bring picture ID. Patient should be prepared to give a urine specimen  Please do not eat 10-12 hours before your appointment if you are coming in fasting for labs on lipids, cholesterol, or glucose (sugar). Pregnant women should follow their Care Team instructions. Water with medications is okay. Do not drink coffee or other fluids. If you have concerns about taking  your medications, please ask at office or if scheduling via i-Human Patientshart, send a message by clicking on Secure Messaging, Message Your Care Team.            Dec 18, 2017  1:45 PM CST   Return Visit with Christina Bolivar MD   [facility] [place] ([facility])    [address]  [place] [address] [phone]              Future tests that were ordered for you today     Open Future Orders        Priority Expected Expires  Ordered    Follow-Up with Cardiologist Routine 11/25/2017 7/26/2018 10/26/2017    CBC with platelets differential Routine 10/26/2017 10/26/2018 10/26/2017            Who to contact     If you have questions or need follow up information about today's clinic visit or your schedule please contact [facility] directly at [phone].  Normal or non-critical lab and imaging results will be communicated to you by Raptrhart, letter or phone within 4 business days after the clinic has received the results. If you do not hear from us within 7 days, please contact the clinic through [employer]City[employer] or phone. If you have a critical or abnormal lab result, we will notify you by phone as soon as possible.  Submit refill requests through [employer]s or call your pharmacy and they will forward the refill request to us. Please allow 3 business days for your refill to be completed.          Additional Information About Your Visit        Raptrhart Information     [employer]s gives you secure access to your electronic health record. If you see a primary care provider, you can also send messages to your care team and make appointments. If you have questions, please call your primary care clinic.  If you do not have a primary care provider, please call [phone] and they will assist you.        Care EveryWhere ID     This is your Care EveryWhere ID. This could be used by other organizations to access your [facility] medical records  [id number]         Blood Pressure from Last 3 Encounters:   10/26/17 142/88   10/13/17 117/81   09/29/17 126/84    Weight from Last 3 Encounters:   10/26/17 81.8 kg (180 lb 6.4 oz)   10/13/17 81.9 kg (180 lb 9.6 oz)   09/29/17 81.6 kg (180 lb)              We Performed the Following     HST-Home Sleep Apnea Test          Today's Medication Changes          These changes are accurate as of: 10/26/17 11:59 PM.  If you have any questions, ask your nurse or doctor.               Start taking these medicines.         Dose/Directions    Colchicine 0.6 MG Caps   Commonly known as:  MITIGARE   Used for:  Pericarditis, unspecified chronicity, unspecified type   Replaces:  colchicine 0.6 MG tablet   Started by:  Christina Bolivar MD        Dose:  0.6 mg   Take 0.6 mg by mouth 2 times daily   Quantity:  60 capsule   Refills:  1         Stop taking these medicines if you haven't already. Please contact your care team if you have questions.     colchicine 0.6 MG tablet   Replaced by:  Colchicine 0.6 MG Caps   Stopped by:  Christina Bolivar MD           cyclobenzaprine 10 MG tablet   Commonly known as:  FLEXERIL   Stopped by:  Christina Bolivar MD           ibuprofen 600 MG tablet   Commonly known as:  ADVIL/MOTRIN   Stopped by:  Christina Bolivar MD           LIPITOR PO   Stopped by:  Christina Bolivar MD                Where to get your medicines      These medications were sent to [facility] [address] [place] [address] [address] [facility]  [address] [facility] [address]     Phone:  [phone]     Colchicine 0.6 MG Caps                Primary Care Provider Office Phone # Fax #    Mauricio Chowdary [phone]       [facility] [address]  [facility] [address]        Equal Access to Services     KANDACE GO : Hadii aad ku hadasho Soomaali, waaxda luqadaha, qaybta kaalmada adeegyada, nica sanchez. So [facility] [phone].    ATENCIÓN: Si habla español, tiene a molina disposición servicios gratuitos de asistencia lingüística. Keerthi al [phone].    We comply with applicable federal civil rights laws and [place] laws. We do not discriminate on the basis of race, color, national origin, age, disability, sex, sexual orientation, or gender identity.            Thank you!     Thank you for choosing [facility]  for your care. Our goal is always to provide you with excellent care. Hearing back from our patients is  one way we can continue to improve our services. Please take a few minutes to complete the written survey that you may receive in the mail after your visit with us. Thank you!             Your Updated Medication List - Protect others around you: Learn how to safely use, store and throw away your medicines at www.disposemymeds.org.          This list is accurate as of: 10/26/17 11:59 PM.  Always use your most recent med list.                   Brand Name Dispense Instructions for use Diagnosis    Colchicine 0.6 MG Caps    MITIGARE    60 capsule    Take 0.6 mg by mouth 2 times daily    Pericarditis, unspecified chronicity, unspecified type       MULTIVITAMIN ADULTS PO      Take by mouth daily        PRAVASTATIN SODIUM PO      Take 40 mg by mouth daily

## 2017-10-26 NOTE — MR AVS SNAPSHOT
After Visit Summary   10/26/2017    Michael S Rosler    MRN: [id number]           Patient Information     Date Of Birth          1960        Visit Information        Provider Department      10/26/2017 8:15 AM Christina Bolivar MD [facility] [place]        Today's Diagnoses     Pericarditis, unspecified chronicity, unspecified type          Care Instructions    1. Today - blood tests before you leave.    2. Increase Colchicine to 0.6 mg two times daily.    3. Stop Ibuprofen.    4. Avoid vigorous exercise for another 6 weeks.    5. I will see you back in 4 to 6 weeks. Another set of labs before you see me.    If you have any questions or concerns, please call my nurse Debora Sainz at [phone].            Follow-ups after your visit        Additional Services     Follow-Up with Cardiologist                 Your next 10 appointments already scheduled     Oct 26, 2017  3:00 PM CDT   HST  with BED 7 SH SLEEP   [facility] ([facility])    [address]  [address]  [place] [address]   [phone]            Oct 27, 2017  9:15 AM CDT   HST Drop Off with  SLEEP CENTER DME   [facility] ([facility])    [address]  [address]  [place] [address]   [phone]            Nov 17, 2017  3:00 PM CST   Return Sleep Patient with Jean Peoples MD   [facility] ([facility])    [address]  [place] [address]   [phone]              Future tests that were ordered for you today     Open Future Orders        Priority Expected Expires Ordered    Erythrocyte sedimentation rate auto Routine 11/23/2017 4/26/2018 10/26/2017    Follow-Up with Cardiologist Routine 11/25/2017 7/26/2018 10/26/2017    CBC with platelets differential Routine 11/23/2017 7/26/2018 10/26/2017    Basic metabolic panel  "Routine 10/26/2017 10/26/2018 10/26/2017    CBC with platelets differential Routine 10/26/2017 10/26/2018 10/26/2017            Who to contact     If you have questions or need follow up information about today's clinic visit or your schedule please contact [facility] [place] directly at [phone].  Normal or non-critical lab and imaging results will be communicated to you by MyChart, letter or phone within 4 business days after the clinic has received the results. If you do not hear from us within 7 days, please contact the clinic through [employer] or phone. If you have a critical or abnormal lab result, we will notify you by phone as soon as possible.  Submit refill requests through [employer] or call your pharmacy and they will forward the refill request to us. Please allow 3 business days for your refill to be completed.          Additional Information About Your Visit        [employer] Information     [employer] gives you secure access to your electronic health record. If you see a primary care provider, you can also send messages to your care team and make appointments. If you have questions, please call your primary care clinic.  If you do not have a primary care provider, please call [phone] and they will assist you.        Care EveryWhere ID     This is your Care EveryWhere ID. This could be used by other organizations to access your [facility] medical records  [id number]        Your Vitals Were     Pulse Height Pulse Oximetry BMI (Body Mass Index)          90 1.803 m (5' 11\") 96% 25.16 kg/m2         Blood Pressure from Last 3 Encounters:   10/26/17 142/88   10/13/17 117/81   09/29/17 126/84    Weight from Last 3 Encounters:   10/26/17 81.8 kg (180 lb 6.4 oz)   10/13/17 81.9 kg (180 lb 9.6 oz)   09/29/17 81.6 kg (180 lb)              We Performed the Following     CRP inflammation     EKG 12-lead complete w/read - Clinics     Follow-Up with Cardiologist          Today's " Medication Changes          These changes are accurate as of: 10/26/17  9:06 AM.  If you have any questions, ask your nurse or doctor.               Start taking these medicines.        Dose/Directions    Colchicine 0.6 MG Caps   Commonly known as:  MITIGARE   Used for:  Pericarditis, unspecified chronicity, unspecified type   Replaces:  colchicine 0.6 MG tablet   Started by:  Christina Bolivar MD        Dose:  0.6 mg   Take 0.6 mg by mouth 2 times daily   Quantity:  60 capsule   Refills:  1         Stop taking these medicines if you haven't already. Please contact your care team if you have questions.     colchicine 0.6 MG tablet   Replaced by:  Colchicine 0.6 MG Caps   Stopped by:  Christina Bolivar MD           cyclobenzaprine 10 MG tablet   Commonly known as:  FLEXERIL   Stopped by:  Christina Bolivar MD           ibuprofen 600 MG tablet   Commonly known as:  ADVIL/MOTRIN   Stopped by:  Christina Bolivar MD           LIPITOR PO   Stopped by:  Christina Bolivar MD                Where to get your medicines      These medications were sent to [facility] [address] [place] [address] [address] [facility]  [facility] [address] [facility] [address]     Phone:  [phone]     Colchicine 0.6 MG Caps                Primary Care Provider Office Phone # Fax #    Mauricio WENDY Chowdary [phone]       [facility] [address]  [facility] [place] [address]        Equal Access to Services     [facility] VistaMATTHEW AH: Hadii phil arce hadasho Soorinali, waaxda luqadaha, qaybta kaalmada adeegyada, waxay idiaron sanchez. So [facility] [phone].    ATENCIÓN: Si habla español, tiene a molina disposición servicios gratuitos de asistencia lingüística. Llame al [phone].    We comply with applicable federal civil rights laws and [place] laws. We do not discriminate on the basis of race, color, national origin, age, disability, sex, sexual orientation, or gender  identity.            Thank you!     Thank you for choosing [facility] [place]  for your care. Our goal is always to provide you with excellent care. Hearing back from our patients is one way we can continue to improve our services. Please take a few minutes to complete the written survey that you may receive in the mail after your visit with us. Thank you!             Your Updated Medication List - Protect others around you: Learn how to safely use, store and throw away your medicines at www.disposemymeds.org.          This list is accurate as of: 10/26/17  9:06 AM.  Always use your most recent med list.                   Brand Name Dispense Instructions for use Diagnosis    Colchicine 0.6 MG Caps    MITIGARE    60 capsule    Take 0.6 mg by mouth 2 times daily    Pericarditis, unspecified chronicity, unspecified type       MULTIVITAMIN ADULTS PO      Take by mouth daily        PRAVASTATIN SODIUM PO      Take 40 mg by mouth daily

## 2017-10-26 NOTE — NURSING NOTE
Call from patient and pharmacy - Rx for  colchicine (MITIGARE) sent by Dr Bolivar - verbal by Dr Bolivar provided for RX.  Patient states pharmacist asked him to review with Cardiology ie Colchicine can potentiate pravastatin. Reviewed with Dr Bolivar - no change in drug therapy. Informed patient of same, he agrees to monitor for myalgias and side effects and call as needed.    Debora Weathers RN 10/26/17 4:23 PM

## 2017-10-26 NOTE — PROGRESS NOTES
"REFERRING PROVIDER:  Ankit Romo MD, Emergency Medicine       PRIMARY CARE PROVIDER:  Mauricio Chowdary MD       REASON FOR VISIT:  New diagnosis of acute pericarditis.      HISTORY OF PRESENT ILLNESS:    Mr. 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  I have known him from previous visits related to nonspecific palpitations with unrevealing testing.  He is a slim gentleman, avid exerciser, has never smoked, does not drink caffeine or alcohol or over-the-counter energy drinks.  He is on 40 mg of pravastatin for hyperlipidemia and has mild sleep apnea, which has been corrected with an oral appliance. He is known to have intermittent migraine headaches, but otherwise in excellent baseline health.      1. Acute pericarditis.      Mr. Rosler brought an excellent log of recent events on his calendar.  On 09/28/2017, as he was lying in bed, he was woken up with sharp and severe upper back pain that took him to the emergency room.  An ECG showed sinus rhythm with mild upsloping ST elevations in the inferolateral leads (subtly new compared to his previous ECG).  CRP was elevated at 44, but serum troponin, leukocyte count and a CT chest were all unremarkable.  Initially, this was thought to be a muscle spasm, but the pain persisted, and he re-presented to the ED the next day and was diagnosed with acute pericarditis.  The ER physician also apparently told him that he had a \"murmur,\" which was not auscultated on his previous visits with me.  He was appropriately started on colchicine 0.6 mg daily and advised ibuprofen.  He took this for 4 days and felt markedly improved and therefore discontinued it.  A few days later he had recurrence of sternal chest discomfort that got aggravated with exercise, lasted a few minutes and relieved when he lay down.  He started taking colchicine and ibuprofen again and has been on it for the last 2 weeks and has been completely " symptom free.      There is no prior history of pericarditis.  He did not have a flu-like illness at the time his symptoms started.  No history of connective tissue disease, malignancy, chest wall trauma or recent intervention/surgery.       CURRENT MEDICATIONS:     1.  Colchicine 0.6 mg daily.   2.  Ibuprofen 600 mg b.i.d.   3.  Pravastatin 40 mg daily.   4.  Daily multivitamin.      MEDICATION ALLERGIES:  No known allergies.      Please see my attached note in Epic for review of systems, past medical, surgical and social history.        PHYSICAL EXAMINATION:   VITAL SIGNS:  Blood pressure 142/88, pulse 90 per minute, height 1.803 m, weight 81.8 kg (180 pounds), respiratory rate 14 per minute, saturations 96% on room air, BMI 25.16 kg/m2.   CONSTITUTIONAL:  Slim, completely comfortable at rest, alert, oriented, well developed and well nourished.   EYES:  No pallor, icterus or xanthelasma.   CARDIOVASCULAR:  Normal jugular venous pressure.  Normal heart sounds.  No audible pericardial friction rub or murmur.   RESPIRATORY:  Bilateral normal breath sounds.  No rales, wheeze or friction rub.   EXTREMITIES:  No edema.        DIAGNOSTIC DATA:   LABORATORY DATA 09/29/2017:  Normal metabolic panel, troponin I negative, CRP elevated at 44.7, leukocytes 9.3, hemoglobin 14.4, platelets 195.      Serial ECGs:  During his ER visit on 08/29/2017, inferolateral upsloping ST elevation (less than 0.5 mm), consistent with a localized pericarditis.      Chest CT:  Negative for lymph nodes, pulmonary embolus or aortic pathology.      Transthoracic echocardiogram 10/10/2017:  I personally reviewed the images with the patient.  Normal left and right ventricular systolic function.  No evidence of constriction.  No pericardial effusion.  Normal cardiac valves.  The ascending aorta measured 3.5 cm.      DIAGNOSIS:    1. Acute pericarditis, index episode 08/28/2017.  Resolved with conservative therapy with colchicine and ibuprofen.       ASSESSMENT:    This is a 56-year-old gentleman with an index episode of mild, acute pericarditis that has responded favorably to colchicine and ibuprofen.  His echocardiogram shows no evidence of constrictive physiology or pericardial effusion.  He has no other systemic diseases that would be of concern.  I think this is idiopathic pericarditis that is resolving.      RECOMMENDATIONS:     1.  Increase colchicine from 0.6 mg once daily to b.i.d., while discontinuing ibuprofen.  Check CRP and sed rate today.   2.  Intended duration of colchicine will be 6-8 weeks.  Mr. Rosler has an excellent and vigorous physical exercise regimen, and I would like him to be able to enjoy this eventually without recurrent pericarditis becoming a problem.   3.  I will see him back in 4-6 weeks.  Until then, he can continue daily activities, but avoid vigorous exercise.  Repeat CRP, CBC and sed rate prior to return visit.  At that time we will consider discontinuing the colchicine.      It was my pleasure to visit with this nancy gentleman.  I look forward to seeing him again.      cc:   Ankit Romo MD   [facility]    [address] [place] [address]      Mauricio Chowdary MD   [facility]    [address]   [address] RAKESH DE ANDA MD             D: 10/26/2017 09:24   T: 10/26/2017 13:17   MT: nyla      Name:     ROSLER, MICHAEL   MRN:      [id number]        Account:      [id number]   :      1960           Service Date: 10/26/2017      Document: [id number]

## 2017-10-26 NOTE — LETTER
"10/26/2017    Ankit Romo MD  [facility]  [address] [place] [address]    RE: Michael S Rosler       Dear Colleague,    I had the pleasure of seeing Michael S Rosler in the [facility].    REFERRING PROVIDER:  Ankit Romo MD, Emergency Medicine       PRIMARY CARE PROVIDER:  Mauricio Chowdary MD       REASON FOR VISIT:  New diagnosis of acute pericarditis.     Mr. 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  I have known him from previous visits related to nonspecific palpitations with unrevealing testing.  He is a slim gentleman, avid exerciser, has never smoked, does not drink caffeine or alcohol or over-the-counter energy drinks.  He is on 40 mg of pravastatin for hyperlipidemia and has mild sleep apnea, which has been corrected with an oral appliance. He is known to have intermittent migraine headaches, but otherwise in excellent baseline health.      1. Acute pericarditis.      Mr. Rosler brought an excellent log of recent events on his calendar.  On 09/28/2017, as he was lying in bed, he was woken up with sharp and severe upper back pain that took him to the emergency room.  An ECG showed sinus rhythm with mild upsloping ST elevations in the inferolateral leads (subtly new compared to his previous ECG).  CRP was elevated at 44, but serum troponin, leukocyte count and a CT chest were all unremarkable.  Initially, this was thought to be a muscle spasm, but the pain persisted, and he re-presented to the ED the next day and was diagnosed with acute pericarditis.  The ER physician also apparently told him that he had a \"murmur,\" which was not auscultated on his previous visits with me.  He was appropriately started on colchicine 0.6 mg daily and advised ibuprofen.  He took this for 4 days and felt markedly improved and therefore discontinued it.  A few days later he had recurrence of " sternal chest discomfort that got aggravated with exercise, lasted a few minutes and relieved when he lay down.  He started taking colchicine and ibuprofen again and has been on it for the last 2 weeks and has been completely symptom free.      There is no prior history of pericarditis.  He did not have a flu-like illness at the time his symptoms started.  No history of connective tissue disease, malignancy, chest wall trauma or recent intervention/surgery.       CURRENT MEDICATIONS:     1.  Colchicine 0.6 mg daily.   2.  Ibuprofen 600 mg b.i.d.   3.  Pravastatin 40 mg daily.   4.  Daily multivitamin.      MEDICATION ALLERGIES:  No known allergies.      Please see my attached note in Epic for review of systems, past medical, surgical and social history.        PHYSICAL EXAMINATION:   VITAL SIGNS:  Blood pressure 142/88, pulse 90 per minute, height 1.803 m, weight 81.8 kg (180 pounds), respiratory rate 14 per minute, saturations 96% on room air, BMI 25.16 kg/m2.   CONSTITUTIONAL:  Slim, completely comfortable at rest, alert, oriented, well developed and well nourished.   EYES:  No pallor, icterus or xanthelasma.   CARDIOVASCULAR:  Normal jugular venous pressure.  Normal heart sounds.  No audible pericardial friction rub or murmur.   RESPIRATORY:  Bilateral normal breath sounds.  No rales, wheeze or friction rub.   EXTREMITIES:  No edema.        DIAGNOSTIC DATA:   LABORATORY DATA 09/29/2017:  Normal metabolic panel, troponin I negative, CRP elevated at 44.7, leukocytes 9.3, hemoglobin 14.4, platelets 195.      Serial ECGs:  During his ER visit on 08/29/2017, inferolateral upsloping ST elevation (less than 0.5 mm), consistent with a localized pericarditis.      Chest CT:  Negative for lymph nodes, pulmonary embolus or aortic pathology.      Transthoracic echocardiogram 10/10/2017:  I personally reviewed the images with the patient.  Normal left and right ventricular systolic function.  No evidence of constriction.  No  pericardial effusion.  Normal cardiac valves.  The ascending aorta measured 3.5 cm.      DIAGNOSIS:    1. Acute pericarditis, index episode 08/28/2017.  Resolved with conservative therapy with colchicine and ibuprofen.      ASSESSMENT:    This is a 56-year-old gentleman with an index episode of mild, acute pericarditis that has responded favorably to colchicine and ibuprofen.  His echocardiogram shows no evidence of constrictive physiology or pericardial effusion.  He has no other systemic diseases that would be of concern.  I think this is idiopathic pericarditis that is resolving.      RECOMMENDATIONS:     1.  Increase colchicine from 0.6 mg once daily to b.i.d., while discontinuing ibuprofen.  Check CRP and sed rate today.   2.  Intended duration of colchicine will be 6-8 weeks.  Mr. Rosler has an excellent and vigorous physical exercise regimen, and I would like him to be able to enjoy this eventually without recurrent pericarditis becoming a problem.   3.  I will see him back in 4-6 weeks.  Until then, he can continue daily activities, but avoid vigorous exercise.  Repeat CRP, CBC and sed rate prior to return visit.  At that time we will consider discontinuing the colchicine.      It was my pleasure to visit with this nancy gentleman.  I look forward to seeing him again.      Sincerely,    Elizabethilal Apurva Bolivar MD     [facility]    cc:   Mauricio Chowdary MD  [facility] [address]  [facility] [address]

## 2017-10-27 ENCOUNTER — DOCUMENTATION ONLY (OUTPATIENT)
Dept: SLEEP MEDICINE | Facility: CLINIC | Age: 57
End: 2017-10-27

## 2017-10-27 DIAGNOSIS — G47.33 OSA (OBSTRUCTIVE SLEEP APNEA): ICD-10-CM

## 2017-10-27 NOTE — PROGRESS NOTES
HST drop off  Download successful. Routed for scoring.    Shanon VenturaMathur  Sleep Clinic - Specialist

## 2017-10-27 NOTE — PROGRESS NOTES
Patient instructed on HST use. Patient demonstrated and verbalized knowledge of use. Device programmed to start at 10:30pm. Device will be returned tomorrow before noon.    Shanon Jensenham  Sleep Clinic - Specialist

## 2017-10-30 ENCOUNTER — HOSPITAL ENCOUNTER (EMERGENCY)
Facility: CLINIC | Age: 57
Discharge: HOME | End: 2017-10-30
Attending: EMERGENCY MEDICINE | Admitting: EMERGENCY MEDICINE
Payer: COMMERCIAL

## 2017-10-30 ENCOUNTER — CARE COORDINATION (OUTPATIENT)
Dept: CARDIOLOGY | Facility: CLINIC | Age: 57
End: 2017-10-30

## 2017-10-30 ENCOUNTER — APPOINTMENT (OUTPATIENT)
Dept: CT IMAGING | Facility: CLINIC | Age: 57
End: 2017-10-30
Attending: EMERGENCY MEDICINE
Payer: COMMERCIAL

## 2017-10-30 VITALS
SYSTOLIC BLOOD PRESSURE: 149 MMHG | BODY MASS INDEX: 25.77 KG/M2 | RESPIRATION RATE: 20 BRPM | TEMPERATURE: 97.3 F | HEIGHT: 70 IN | DIASTOLIC BLOOD PRESSURE: 93 MMHG | HEART RATE: 86 BPM | OXYGEN SATURATION: 94 % | WEIGHT: 180 LBS

## 2017-10-30 DIAGNOSIS — N20.1 CALCULUS OF URETER: ICD-10-CM

## 2017-10-30 LAB
ALBUMIN SERPL-MCNC: 3.7 G/DL (ref 3.4–5)
ALBUMIN UR-MCNC: NEGATIVE MG/DL
ALP SERPL-CCNC: 90 U/L (ref 40–150)
ALT SERPL W P-5'-P-CCNC: 68 U/L (ref 0–70)
ANION GAP SERPL CALCULATED.3IONS-SCNC: 8 MMOL/L (ref 3–14)
APPEARANCE UR: CLEAR
AST SERPL W P-5'-P-CCNC: 34 U/L (ref 0–45)
BASOPHILS # BLD AUTO: 0 10E9/L (ref 0–0.2)
BASOPHILS NFR BLD AUTO: 0.5 %
BILIRUB SERPL-MCNC: 0.3 MG/DL (ref 0.2–1.3)
BILIRUB UR QL STRIP: NEGATIVE
BUN SERPL-MCNC: 25 MG/DL (ref 7–30)
CALCIUM SERPL-MCNC: 9.4 MG/DL (ref 8.5–10.1)
CHLORIDE SERPL-SCNC: 105 MMOL/L (ref 94–109)
CO2 SERPL-SCNC: 25 MMOL/L (ref 20–32)
COLOR UR AUTO: YELLOW
CREAT SERPL-MCNC: 1.05 MG/DL (ref 0.66–1.25)
DIFFERENTIAL METHOD BLD: NORMAL
EOSINOPHIL # BLD AUTO: 0.1 10E9/L (ref 0–0.7)
EOSINOPHIL NFR BLD AUTO: 0.6 %
ERYTHROCYTE [DISTWIDTH] IN BLOOD BY AUTOMATED COUNT: 12.6 % (ref 10–15)
GFR SERPL CREATININE-BSD FRML MDRD: 73 ML/MIN/1.7M2
GLUCOSE SERPL-MCNC: 96 MG/DL (ref 70–99)
GLUCOSE UR STRIP-MCNC: NEGATIVE MG/DL
HCT VFR BLD AUTO: 40.9 % (ref 40–53)
HGB BLD-MCNC: 14.3 G/DL (ref 13.3–17.7)
HGB UR QL STRIP: ABNORMAL
IMM GRANULOCYTES # BLD: 0.1 10E9/L (ref 0–0.4)
IMM GRANULOCYTES NFR BLD: 0.8 %
KETONES UR STRIP-MCNC: 40 MG/DL
LEUKOCYTE ESTERASE UR QL STRIP: NEGATIVE
LYMPHOCYTES # BLD AUTO: 1 10E9/L (ref 0.8–5.3)
LYMPHOCYTES NFR BLD AUTO: 12.1 %
MCH RBC QN AUTO: 31.4 PG (ref 26.5–33)
MCHC RBC AUTO-ENTMCNC: 35 G/DL (ref 31.5–36.5)
MCV RBC AUTO: 90 FL (ref 78–100)
MONOCYTES # BLD AUTO: 0.7 10E9/L (ref 0–1.3)
MONOCYTES NFR BLD AUTO: 7.8 %
MUCOUS THREADS #/AREA URNS LPF: PRESENT /LPF
NEUTROPHILS # BLD AUTO: 6.7 10E9/L (ref 1.6–8.3)
NEUTROPHILS NFR BLD AUTO: 78.2 %
NITRATE UR QL: NEGATIVE
NRBC # BLD AUTO: 0 10*3/UL
NRBC BLD AUTO-RTO: 0 /100
PH UR STRIP: 5.5 PH (ref 5–7)
PLATELET # BLD AUTO: 245 10E9/L (ref 150–450)
POTASSIUM SERPL-SCNC: 4.6 MMOL/L (ref 3.4–5.3)
PROT SERPL-MCNC: 7.6 G/DL (ref 6.8–8.8)
RBC # BLD AUTO: 4.55 10E12/L (ref 4.4–5.9)
RBC #/AREA URNS AUTO: ABNORMAL /HPF
SODIUM SERPL-SCNC: 138 MMOL/L (ref 133–144)
SOURCE: ABNORMAL
SP GR UR STRIP: 1.01 (ref 1–1.03)
UROBILINOGEN UR STRIP-ACNC: 0.2 EU/DL (ref 0.2–1)
WBC # BLD AUTO: 8.5 10E9/L (ref 4–11)
WBC #/AREA URNS AUTO: ABNORMAL /HPF

## 2017-10-30 PROCEDURE — 96361 HYDRATE IV INFUSION ADD-ON: CPT

## 2017-10-30 PROCEDURE — 74177 CT ABD & PELVIS W/CONTRAST: CPT

## 2017-10-30 PROCEDURE — 85025 COMPLETE CBC W/AUTO DIFF WBC: CPT | Performed by: EMERGENCY MEDICINE

## 2017-10-30 PROCEDURE — 25000128 H RX IP 250 OP 636: Performed by: EMERGENCY MEDICINE

## 2017-10-30 PROCEDURE — 96375 TX/PRO/DX INJ NEW DRUG ADDON: CPT

## 2017-10-30 PROCEDURE — 25000125 ZZHC RX 250

## 2017-10-30 PROCEDURE — 96376 TX/PRO/DX INJ SAME DRUG ADON: CPT

## 2017-10-30 PROCEDURE — 25000132 ZZH RX MED GY IP 250 OP 250 PS 637: Performed by: EMERGENCY MEDICINE

## 2017-10-30 PROCEDURE — 80053 COMPREHEN METABOLIC PANEL: CPT | Performed by: EMERGENCY MEDICINE

## 2017-10-30 PROCEDURE — 99285 EMERGENCY DEPT VISIT HI MDM: CPT | Mod: 25

## 2017-10-30 PROCEDURE — 25000125 ZZHC RX 250: Performed by: EMERGENCY MEDICINE

## 2017-10-30 PROCEDURE — 96374 THER/PROPH/DIAG INJ IV PUSH: CPT | Mod: 59

## 2017-10-30 PROCEDURE — 81001 URINALYSIS AUTO W/SCOPE: CPT | Performed by: EMERGENCY MEDICINE

## 2017-10-30 RX ORDER — SODIUM CHLORIDE 9 MG/ML
1000 INJECTION, SOLUTION INTRAVENOUS CONTINUOUS
Status: DISCONTINUED | OUTPATIENT
Start: 2017-10-30 | End: 2017-10-30 | Stop reason: CLARIF

## 2017-10-30 RX ORDER — HYDROMORPHONE HYDROCHLORIDE 1 MG/ML
0.5 INJECTION, SOLUTION INTRAMUSCULAR; INTRAVENOUS; SUBCUTANEOUS ONCE
Status: COMPLETED | OUTPATIENT
Start: 2017-10-30 | End: 2017-10-30

## 2017-10-30 RX ORDER — OXYCODONE HYDROCHLORIDE 5 MG/1
5-10 TABLET ORAL EVERY 6 HOURS PRN
Qty: 24 TABLET | Refills: 0 | Status: SHIPPED | OUTPATIENT
Start: 2017-10-30 | End: 2017-11-17

## 2017-10-30 RX ORDER — ACETAMINOPHEN 500 MG
500 TABLET ORAL EVERY 6 HOURS PRN
Qty: 20 TABLET | Refills: 0 | Status: SHIPPED | OUTPATIENT
Start: 2017-10-30 | End: 2017-11-04

## 2017-10-30 RX ORDER — HYDROMORPHONE HYDROCHLORIDE 1 MG/ML
0.5 INJECTION, SOLUTION INTRAMUSCULAR; INTRAVENOUS; SUBCUTANEOUS
Status: DISCONTINUED | OUTPATIENT
Start: 2017-10-30 | End: 2017-10-30 | Stop reason: HOSPADM

## 2017-10-30 RX ORDER — OXYCODONE AND ACETAMINOPHEN 5; 325 MG/1; MG/1
1 TABLET ORAL EVERY 4 HOURS PRN
Status: DISCONTINUED | OUTPATIENT
Start: 2017-10-30 | End: 2017-10-30 | Stop reason: HOSPADM

## 2017-10-30 RX ORDER — ONDANSETRON 4 MG/1
4 TABLET, ORALLY DISINTEGRATING ORAL ONCE
Status: COMPLETED | OUTPATIENT
Start: 2017-10-30 | End: 2017-10-30

## 2017-10-30 RX ORDER — TAMSULOSIN HYDROCHLORIDE 0.4 MG/1
0.4 CAPSULE ORAL DAILY
Qty: 10 CAPSULE | Refills: 0 | Status: SHIPPED | OUTPATIENT
Start: 2017-10-30 | End: 2017-11-09

## 2017-10-30 RX ORDER — ONDANSETRON 2 MG/ML
4 INJECTION INTRAMUSCULAR; INTRAVENOUS ONCE
Status: COMPLETED | OUTPATIENT
Start: 2017-10-30 | End: 2017-10-30

## 2017-10-30 RX ORDER — IOPAMIDOL 755 MG/ML
91 INJECTION, SOLUTION INTRAVASCULAR ONCE
Status: COMPLETED | OUTPATIENT
Start: 2017-10-30 | End: 2017-10-30

## 2017-10-30 RX ORDER — ONDANSETRON 4 MG/1
4 TABLET, ORALLY DISINTEGRATING ORAL EVERY 8 HOURS PRN
Qty: 10 TABLET | Refills: 0 | Status: SHIPPED | OUTPATIENT
Start: 2017-10-30 | End: 2017-11-02

## 2017-10-30 RX ORDER — ONDANSETRON 2 MG/ML
INJECTION INTRAMUSCULAR; INTRAVENOUS
Status: DISCONTINUED
Start: 2017-10-30 | End: 2017-10-30 | Stop reason: HOSPADM

## 2017-10-30 RX ORDER — ONDANSETRON 4 MG/1
TABLET, ORALLY DISINTEGRATING ORAL
Status: COMPLETED
Start: 2017-10-30 | End: 2017-10-30

## 2017-10-30 RX ORDER — KETOROLAC TROMETHAMINE 15 MG/ML
15 INJECTION, SOLUTION INTRAMUSCULAR; INTRAVENOUS ONCE
Status: COMPLETED | OUTPATIENT
Start: 2017-10-30 | End: 2017-10-30

## 2017-10-30 RX ADMIN — HYDROMORPHONE HYDROCHLORIDE 0.5 MG: 1 INJECTION, SOLUTION INTRAMUSCULAR; INTRAVENOUS; SUBCUTANEOUS at 14:09

## 2017-10-30 RX ADMIN — HYDROMORPHONE HYDROCHLORIDE 0.5 MG: 1 INJECTION, SOLUTION INTRAMUSCULAR; INTRAVENOUS; SUBCUTANEOUS at 15:06

## 2017-10-30 RX ADMIN — SODIUM CHLORIDE 1000 ML: 9 INJECTION, SOLUTION INTRAVENOUS at 14:09

## 2017-10-30 RX ADMIN — ONDANSETRON 4 MG: 2 SOLUTION INTRAMUSCULAR; INTRAVENOUS at 15:10

## 2017-10-30 RX ADMIN — IOPAMIDOL 91 ML: 755 INJECTION, SOLUTION INTRAVENOUS at 14:27

## 2017-10-30 RX ADMIN — HYDROMORPHONE HYDROCHLORIDE 0.5 MG: 1 INJECTION, SOLUTION INTRAMUSCULAR; INTRAVENOUS; SUBCUTANEOUS at 15:27

## 2017-10-30 RX ADMIN — ONDANSETRON 4 MG: 4 TABLET, ORALLY DISINTEGRATING ORAL at 13:09

## 2017-10-30 RX ADMIN — OXYCODONE HYDROCHLORIDE AND ACETAMINOPHEN 1 TABLET: 5; 325 TABLET ORAL at 16:59

## 2017-10-30 RX ADMIN — SODIUM CHLORIDE 68 ML: 9 INJECTION, SOLUTION INTRAVENOUS at 14:24

## 2017-10-30 RX ADMIN — KETOROLAC TROMETHAMINE 15 MG: 15 INJECTION, SOLUTION INTRAMUSCULAR; INTRAVENOUS at 15:26

## 2017-10-30 ASSESSMENT — ENCOUNTER SYMPTOMS
FEVER: 0
ABDOMINAL PAIN: 1
NAUSEA: 1
DIARRHEA: 1

## 2017-10-30 NOTE — ED PROVIDER NOTES
"  History     Chief Complaint:  Abdominal pain    HPI   Michael S Rosler is a 56 year old male who presents with abdominal pain. The patient reports that he was in the emergency department approximately one month ago and was told that he has pericarditis. He continued to follow up with cardiology and had an echo performed confirming pericarditis. He was started on Colchicine 0.6 mg daily. Four days ago he was seen by the cardiologist once again and his colchicine was doubled to 0.6 mg twice daily. Since that time he has been having loose stools and today he woke up with some new abdominal pain. This pain is located in the right lower quadrant and does not radiate anywhere per the patient. He denies having had any fevers. The patient notes that he does still have his appendix.    Allergies:  No Known Drug Allergies      Medications:    Colchicine  Pravastatin    Past Medical History:    Hyperlipidemia  Insomnia  Keratosis seborrheica  Kidney stone  Migraine  Other anxiety states    Past Surgical History:    Hernia repair inguinal bilateral  Vasectomy    Family History:    COPD  Neurologic disorder  Brain tumor    Social History:  Smoking Status: No  Smokeless Tobacco: No  Alcohol Use: Yes   Marital Status:   [4]    Review of Systems   Constitutional: Negative for fever.   Gastrointestinal: Positive for abdominal pain, diarrhea and nausea.   All other systems reviewed and are negative.    Physical Exam   Vitals:  Patient Vitals for the past 24 hrs:   BP Temp Pulse Heart Rate Resp SpO2 Height Weight   10/30/17 1633 - - - - - 94 % - -   10/30/17 1632 - - - - - 93 % - -   10/30/17 1631 - - - - - 94 % - -   10/30/17 1630 (!) 149/93 - - 79 20 - - -   10/30/17 1406 137/89 - - - - - - -   10/30/17 1304 132/89 97.3  F (36.3  C) 86 - 20 98 % 1.778 m (5' 10\") 81.6 kg (180 lb)      Physical Exam  General: Resting on the gurney, appears very uncomfortable  Head:  The scalp, face, and head appear " normal  Mouth/Throat: Mucus membranes are moist  CV:  Regular rate    Normal S1 and S2  No pathological murmur   Resp:  Breath sounds clear and equal bilaterally    Non-labored, no retractions or accessory muscle use    No coarseness    No wheezing   GI:  Abdomen is soft, no rigidity    Slight lower right quadrant tenderness to palpation. No guarding and no rebound  MS:  Normal motor assessment of all extremities.    Good capillary refill noted.  Skin:  No rash or lesions noted.  Neuro:   Speech is normal and fluent. No apparent deficit.  Psych: Awake. Alert.  Normal affect.      Appropriate interactions.     Emergency Department Course     Imaging:  Radiology findings were communicated with the patient who voiced understanding of the findings.  CT abdomen pelvis w contrast:   IMPRESSION:   1. 3 mm probable obstructing stone in the distal right ureter  contributing to mild right hydroureter and hydronephrosis.  2. 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 There  are parapelvic cysts in the left kidney.  3. Tiny nonobstructing stones in the kidneys as above.  4. Colonic diverticulosis without evidence for acute diverticulitis.  Reading per radiology.     Laboratory:  Laboratory findings were communicated with the patient who voiced understanding of the findings.  CBC: AWNL (WBC 8.5, HGB 14.3, )  CMP: AWNL (Creatinine 1.05)   UA: Urineketon: 40, Blood: Large o/w WNL  Urine microscopic: RBC: 5-10, Mucous: present    Interventions:  1309 Zofran 4 mg ral   1506 Dilaudid 0.5 mg IV  1510 Zofran 4 mg IV   1512 Normal Saline 1000 mL IV   1526 Toradol 15 mg IV  1527 Dilaudid 0.5 mg IV  1659 Percocet 5-325 mg 1 tablet oral    Emergency Department Course:  Nursing notes and vitals reviewed.  I performed an exam of the patient as documented above.   IV was inserted and blood was drawn for laboratory testing, results above.  The patient  was sent for a CT while in the emergency department, results above.      I discussed the treatment plan with the patient. They expressed understanding of this plan and consented to discharge. They will be discharged home with instructions for care and follow up. In addition, the patient will return to the emergency department if their symptoms persist, worsen, if new symptoms arise or if there is any concern.  All questions were answered.     I personally reviewed the laboratory results with the Patient and answered all related questions prior to discharge.    Impression & Plan      Medical Decision Making:  The patient presented with unilateral flank and abdominal pain consistent with renal colic. CT confirms a ureteral stone. Pain is controlled with interventions in the Emergency Department. There is no fever or evidence of a urinary tract infection. The patient will be discharged with opioid analgesics for pain. Flomax will be prescribed daily to attempt to ease stone passage.   Zofran was prescribed for nausea.  I considered other etiologies for these symptoms including AAA and pyelonephritis but these are unlikely given the otherwise normal CT scan and urinalysis.  The patient is instructed to return if increasing pain not controlled with pain meds, vomiting, and fever. Strain urine to look for stone, if detected, submit to primary doctor for lab analysis. Instructions were given to follow up with urology within one week, sooner if pain continues, as retrieval of the stone may be required for refractory symptoms.     Diagnosis:    ICD-10-CM    1. Calculus of ureter N20.1         Disposition:   Discharged    CMS Diagnoses: None     Discharge Medications:  Discharge Medication List as of 10/30/2017  5:08 PM      START taking these medications    Details   oxyCODONE (ROXICODONE) 5 MG IR tablet Take 1-2 tablets (5-10 mg) by mouth every 6 hours as needed for pain, Disp-24 tablet, R-0, Local Print      tamsulosin  (FLOMAX) 0.4 MG capsule Take 1 capsule (0.4 mg) by mouth daily for 10 doses, Disp-10 capsule, R-0, Local Print      ondansetron (ZOFRAN ODT) 4 MG ODT tab Take 1 tablet (4 mg) by mouth every 8 hours as needed for nausea, Disp-10 tablet, R-0, Local Print      acetaminophen (TYLENOL) 500 MG tablet Take 1 tablet (500 mg) by mouth every 6 hours as needed, Disp-20 tablet, R-0, Local Print             Scribe Disclosure:  I, Nick Fernandes, am serving as a scribe at 3:30 PM on 10/30/2017 to document services personally performed by Jacquelyn Spencer MD, based on my observations and the provider's statements to me.    EMERGENCY DEPARTMENT       Jacquelyn Spencer MD  11/01/17 0425

## 2017-10-30 NOTE — ED AVS SNAPSHOT
Emergency Department    [address] [facility] [address]    Phone:  [phone]    Fax:  [phone]                                       Michael S Rosler   MRN: [id number]    Department:   Emergency Department   Date of Visit:  10/30/2017           Patient Information     Date Of Birth          1960        Your diagnoses for this visit were:     Calculus of ureter        You were seen by Jacquelyn Spencer MD.      Follow-up Information     Follow up with Mauricio Chowdary MD.    Specialty:  Family Practice    Why:  As needed    Contact information:    [employer]  [address]  [facility] [address] [phone]          Follow up with Abilio Rodriges MD. Schedule an appointment as soon as possible for a visit in 3 days.    Specialty:  Urology    Contact information:    [facility]  [address] [address] [phone]          Follow up with  Emergency Department.    Specialty:  EMERGENCY MEDICINE    Why:  As needed, If symptoms worsen    Contact information:    [address] [facility] [address] [phone]        Discharge Instructions         Discharge Instructions  Kidney Stones    Kidney stones are a common problem that can cause a lot of pain but fortunately are usually not dangerous and can be generally treated with medicine at home.  However, sometimes your condition may be worse than it seemed at first, or may get worse with time.     You need to follow-up with your regular doctor within 3 days.    Most kidney stones will pass on their own, but occasionally stones may need to be removed by an urologist. We will send you home with a urine strainer. Be sure to urinate into this, or urinate into a container and pour the urine through the fine filter to catch the kidney stone as it comes out. The stone will seem like a pebble or grain of sand. Be sure to save this in a Ziploc  bag and take it to the doctor s office  with you.       Return to the Emergency Department if:    Your pain is not controlled.    You are vomiting and can t keep fluids or medications down.    You develop fever (>101).    You feel much more ill or develop new symptoms.  What can I do to help myself?    Be sure to drink plenty of fluids.    Staying active is good, and may help the stone to pass. You may do whatever you feel up to doing without restrictions.   Treatment:    Non-steroidal anti-inflammatory drugs (NSAIDs). This includes prescription medicines like Toradol  (ketorolac) and non-prescription medicines like Advil  (ibuprofen) and Nuprin  (ibuprofen). These pain relievers are very effective for kidney stones.    Narcotic pain pills. If you have been given a narcotic such as Vicodin  (hydrocodone with acetaminophen), Percocet  (oxycodone with acetaminophen), or codeine, do not drive for four hours after you have taken it. If the narcotic contains Tylenol  (acetaminophen), do not take Tylenol  with it. All narcotics will cause constipation, so eat a high fiber diet.      Nausea medication.  Nausea and vomiting are common with kidney stones, so your physician may send you home with medicine for this.     Flomax  (tamsulosin). This medicine is sometimes used for men with prostate problems, but also can help kidney stones to pass. This medicine can lower blood pressure, and you may feel faint, especially when you first stand up. Be sure to get up gradually, sit down if you feel faint, and avoid activity where feeling faint would be dangerous, such as climbing ladders.   If you were given a prescription for medicine here today, be sure to read all of the information (including the package insert) that comes with your prescription.  This will include important information about the medicine, its side effects, and any warnings that you need to know about.  The pharmacist who fills the prescription can provide more information and answer questions you may  have about the medicine.  If you have questions or concerns that the pharmacist cannot address, please call or return to the Emergency Department.   Opioid Medication Information    Pain medications are among the most commonly prescribed medicines, so we are including this information for all our patients. If you did not receive pain medication or get a prescription for pain medicine, you can ignore it.     You may have been given a prescription for an opioid (narcotic) pain medicine and/or have received a pain medicine while here in the Emergency Department. These medicines can make you drowsy or impaired. You must not drive, operate dangerous equipment, or engage in any other dangerous activities while taking these medications. If you drive while taking these medications, you could be arrested for DUI, or driving under the influence. Do not drink any alcohol while you are taking these medications.     Opioid pain medications can cause addiction. If you have a history of chemical dependency of any type, you are at a higher risk of becoming addicted to pain medications.  Only take these prescribed medications to treat your pain when all other options have been tried. Take it for as short a time and as few doses as possible. Store your pain pills in a secure place, as they are frequently stolen and provide a dangerous opportunity for children or visitors in your house to start abusing these powerful medications. We will not replace any lost or stolen medicine.  As soon as your pain is better, you should flush all your remaining medication.     Many prescription pain medications contain Tylenol  (acetaminophen), including Vicodin , Tylenol #3 , Norco , Lortab , and Percocet .  You should not take any extra pills of Tylenol  if you are using these prescription medications or you can get very sick.  Do not ever take more than 3000 mg of acetaminophen in any 24 hour period.    All opioids tend to cause constipation.  Drink plenty of water and eat foods that have a lot of fiber, such as fruits, vegetables, prune juice, apple juice and high fiber cereal.  Take a laxative if you don t move your bowels at least every other day. Miralax , Milk of Magnesia, Colace , or Senna  can be used to keep you regular.      Remember that you can always come back to the Emergency Department if you are not able to see your regular doctor in the amount of time listed above, if you get any new symptoms, or if there is anything that worries you.        Future Appointments        Provider Department Dept Phone Center    11/17/2017 3:00 PM Jean Peoples MD [facility] [phone] [facility]    12/18/2017 12:50 PM JASPER Izquierdo P [facility] [place] [phone] [facility]    12/18/2017 1:45 PM Christina Bolivar MD [facility] [place] [phone] [facility]      24 Hour Appointment Hotline       To make an appointment at any [facility] clinic, call [phone] ([phone]). If you don't have a family doctor or clinic, we will help you find one. [facility] are conveniently located to serve the needs of you and your family.             Review of your medicines      START taking        Dose / Directions Last dose taken    acetaminophen 500 MG tablet   Commonly known as:  TYLENOL   Dose:  500 mg   Quantity:  20 tablet        Take 1 tablet (500 mg) by mouth every 6 hours as needed   Refills:  0        ondansetron 4 MG ODT tab   Commonly known as:  ZOFRAN ODT   Dose:  4 mg   Quantity:  10 tablet        Take 1 tablet (4 mg) by mouth every 8 hours as needed for nausea   Refills:  0        oxyCODONE 5 MG IR tablet   Commonly known as:  ROXICODONE   Dose:  5-10 mg   Quantity:  24 tablet        Take 1-2 tablets (5-10 mg) by mouth every 6 hours as needed for pain   Refills:  0        tamsulosin 0.4 MG capsule   Commonly known as:  FLOMAX   Dose:   0.4 mg   Quantity:  10 capsule        Take 1 capsule (0.4 mg) by mouth daily for 10 doses   Refills:  0          Our records show that you are taking the medicines listed below. If these are incorrect, please call your family doctor or clinic.        Dose / Directions Last dose taken    Colchicine 0.6 MG Caps   Commonly known as:  MITIGARE   Dose:  0.6 mg   Quantity:  60 capsule        Take 0.6 mg by mouth 2 times daily   Refills:  1        MULTIVITAMIN ADULTS PO        Take by mouth daily   Refills:  0        PRAVASTATIN SODIUM PO   Dose:  40 mg        Take 40 mg by mouth daily   Refills:  0                Prescriptions were sent or printed at these locations (4 Prescriptions)                   Other Prescriptions                Printed at Department/Unit printer (4 of 4)         oxyCODONE (ROXICODONE) 5 MG IR tablet               tamsulosin (FLOMAX) 0.4 MG capsule               ondansetron (ZOFRAN ODT) 4 MG ODT tab               acetaminophen (TYLENOL) 500 MG tablet                Procedures and tests performed during your visit     *UA reflex to Microscopic    CBC with platelets differential    CT Abdomen Pelvis w Contrast    Comprehensive metabolic panel    Urine Microscopic      Orders Needing Specimen Collection     None      Pending Results     No orders found from 10/28/2017 to 10/31/2017.            Pending Culture Results     No orders found from 10/28/2017 to 10/31/2017.            Pending Results Instructions     If you had any lab results that were not finalized at the time of your Discharge, you can call the ED Lab Result RN at [phone]. You will be contacted by this team for any positive Lab results or changes in treatment. The nurses are available 7 days a week from 10A to 6:30P.  You can leave a message 24 hours per day and they will return your call.        Test Results From Your Hospital Stay        10/30/2017  2:13 PM      Component Results     Component Value Ref Range & Units Status    WBC  8.5 4.0 - 11.0 10e9/L Final    RBC Count 4.55 4.4 - 5.9 10e12/L Final    Hemoglobin 14.3 13.3 - 17.7 g/dL Final    Hematocrit 40.9 40.0 - 53.0 % Final    MCV 90 78 - 100 fl Final    MCH 31.4 26.5 - 33.0 pg Final    MCHC 35.0 31.5 - 36.5 g/dL Final    RDW 12.6 10.0 - 15.0 % Final    Platelet Count 245 150 - 450 10e9/L Final    Diff Method Automated Method  Final    % Neutrophils 78.2 % Final    % Lymphocytes 12.1 % Final    % Monocytes 7.8 % Final    % Eosinophils 0.6 % Final    % Basophils 0.5 % Final    % Immature Granulocytes 0.8 % Final    Nucleated RBCs 0 0 /100 Final    Absolute Neutrophil 6.7 1.6 - 8.3 10e9/L Final    Absolute Lymphocytes 1.0 0.8 - 5.3 10e9/L Final    Absolute Monocytes 0.7 0.0 - 1.3 10e9/L Final    Absolute Eosinophils 0.1 0.0 - 0.7 10e9/L Final    Absolute Basophils 0.0 0.0 - 0.2 10e9/L Final    Abs Immature Granulocytes 0.1 0 - 0.4 10e9/L Final    Absolute Nucleated RBC 0.0  Final         10/30/2017  2:36 PM      Component Results     Component Value Ref Range & Units Status    Sodium 138 133 - 144 mmol/L Final    Potassium 4.6 3.4 - 5.3 mmol/L Final    Chloride 105 94 - 109 mmol/L Final    Carbon Dioxide 25 20 - 32 mmol/L Final    Anion Gap 8 3 - 14 mmol/L Final    Glucose 96 70 - 99 mg/dL Final    Urea Nitrogen 25 7 - 30 mg/dL Final    Creatinine 1.05 0.66 - 1.25 mg/dL Final    GFR Estimate 73 >60 mL/min/1.7m2 Final    Non  GFR Calc    GFR Estimate If Black 88 >60 mL/min/1.7m2 Final    African American GFR Calc    Calcium 9.4 8.5 - 10.1 mg/dL Final    Bilirubin Total 0.3 0.2 - 1.3 mg/dL Final    Albumin 3.7 3.4 - 5.0 g/dL Final    Protein Total 7.6 6.8 - 8.8 g/dL Final    Alkaline Phosphatase 90 40 - 150 U/L Final    ALT 68 0 - 70 U/L Final    AST 34 0 - 45 U/L Final         10/30/2017  3:56 PM      Narrative     CT ABDOMEN/PELVIS WITH CONTRAST October 30, 2017 2:30 PM     HISTORY: Right lower quadrant pain.    TECHNIQUE: CT of the abdomen and pelvis was performed  following the  administration of 91mL Isovue-370. Radiation dose for this scan was  reduced using automated exposure control, adjustment of the mA and/or  kV according to patient size, or iterative reconstruction technique.    COMPARISON: CT of the abdomen/pelvis dated 1/26/2015.    FINDINGS: The appendix appears unremarkable. There are scattered  colonic diverticuli. These are most concentrated in the distal colon.  There is no evidence for acute diverticulitis. The bowel otherwise  appears unremarkable.    There appears to be a 3 mm stone at the base of the bladder near the  right ureterovesicular junction. There is mild right hydronephrosis  and hydroureter. There is a 2 mm nonobstructing stone at the midpole  of the right kidney.    There are parapelvic cysts in the left kidney. There is mild  prominence of the left intrarenal collecting system but no definite  obstructing stones are seen in the left kidney or left ureter. There  is a 2 mm nonobstructing stone at the lower pole of the left kidney.    The prostate is enlarged in size.    Remaining solid organs in the abdomen appear unremarkable.        Impression     IMPRESSION:   1. 3 mm probable obstructing stone in the distal right ureter  contributing to mild right hydroureter and hydronephrosis.  2. 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 There  are parapelvic cysts in the left kidney.  3. Tiny nonobstructing stones in the kidneys as above.  4. Colonic diverticulosis without evidence for acute diverticulitis.    FAVIOLA LEIGH MD         10/30/2017  4:18 PM      Component Results     Component Value Ref Range & Units Status    Color Urine Yellow  Final    Appearance Urine Clear  Final    Glucose Urine Negative NEG^Negative mg/dL Final    Bilirubin Urine Negative NEG^Negative Final    Ketones Urine 40 (A) NEG^Negative mg/dL Final    Specific Gravity Urine 1.010 1.003 -  1.035 Final    Blood Urine Large (A) NEG^Negative Final    pH Urine 5.5 5.0 - 7.0 pH Final    Protein Albumin Urine Negative NEG^Negative mg/dL Final    Urobilinogen Urine 0.2 0.2 - 1.0 EU/dL Final    Nitrite Urine Negative NEG^Negative Final    Leukocyte Esterase Urine Negative NEG^Negative Final    Source Midstream Urine  Final         10/30/2017  4:18 PM      Component Results     Component Value Ref Range & Units Status    WBC Urine O - 2 OTO2^O - 2 /HPF Final    RBC Urine 5-10 (A) OTO2^O - 2 /HPF Final    Mucous Urine Present (A) NEG^Negative /LPF Final                Clinical Quality Measure: Blood Pressure Screening     Your blood pressure was checked while you were in the emergency department today. The last reading we obtained was  BP: (!) 149/93 . Please read the guidelines below about what these numbers mean and what you should do about them.  If your systolic blood pressure (the top number) is less than 120 and your diastolic blood pressure (the bottom number) is less than 80, then your blood pressure is normal. There is nothing more that you need to do about it.  If your systolic blood pressure (the top number) is 120-139 or your diastolic blood pressure (the bottom number) is 80-89, your blood pressure may be higher than it should be. You should have your blood pressure rechecked within a year by a primary care provider.  If your systolic blood pressure (the top number) is 140 or greater or your diastolic blood pressure (the bottom number) is 90 or greater, you may have high blood pressure. High blood pressure is treatable, but if left untreated over time it can put you at risk for heart attack, stroke, or kidney failure. You should have your blood pressure rechecked by a primary care provider within the next 4 weeks.  If your provider in the emergency department today gave you specific instructions to follow-up with your doctor or provider even sooner than that, you should follow that instruction and  not wait for up to 4 weeks for your follow-up visit.        Thank you for choosing [facility]       Thank you for choosing [facility] for your care. Our goal is always to provide you with excellent care. Hearing back from our patients is one way we can continue to improve our services. Please take a few minutes to complete the written survey that you may receive in the mail after you visit with us. Thank you!        [facility] Information     [employer] gives you secure access to your electronic health record. If you see a primary care provider, you can also send messages to your care team and make appointments. If you have questions, please call your primary care clinic.  If you do not have a primary care provider, please call [phone] and they will assist you.        Care EveryWhere ID     This is your Care EveryWhere ID. This could be used by other organizations to access your [facility] medical records  [id number]        Equal Access to Services     MISTI GO : Christianne Zazueta, tono harman, nica freitas. So [facility] [place] [phone].    ATENCIÓN: Si habla español, tiene a molina disposición servicios gratuitos de asistencia lingüística. Llame al [phone].    We comply with applicable federal civil rights laws and [place] laws. We do not discriminate on the basis of race, color, national origin, age, disability, sex, sexual orientation, or gender identity.            After Visit Summary       This is your record. Keep this with you and show to your community pharmacist(s) and doctor(s) at your next visit.

## 2017-10-30 NOTE — PROGRESS NOTES
"Call from patient with questions on colchicine - he notes some looser stools but no diarrhea on same and will continue to monitor; he stopped ibuprofen as directed at visit and has noted return of some chest discomfort and will be taking ibuprofen PRN. Patient will call if changes or in the next several weeks with update.  Debora Weathers RN 10/30/17 11:38 AM    10/30/87373 115 pm Call from patient stating he is leaving work and going to emergency room with \"severe\" stomach pain that escalated rather quickly post call this am, no pain voiced this am. Reviewed with Dr Martinez in Dr Bolivar's absence - plan: stop colchicine for 2 days, restart dose at daily dosing, if still cramps then decrease it to 1/2 tablet daily, no need for Primary MD followup at this time.  Patient states understanding of instructions but states he is still giong to emergency room to be check out. Patient will call Friday with update on how he is doing - sooner as needed.  Debora Weathers RN 10/30/17 1:26 PM         "

## 2017-10-30 NOTE — ED AVS SNAPSHOT
Emergency Department    [address] [place] [id number]    Phone:  [phone]    Fax:  [phone]                                       Michael S Rosler   MRN: [id number]    Department:   Emergency Department   Date of Visit:  10/30/2017           After Visit Summary Signature Page     I have received my discharge instructions, and my questions have been answered. I have discussed any challenges I see with this plan with the nurse or doctor.    ..........................................................................................................................................  Patient/Patient Representative Signature      ..........................................................................................................................................  Patient Representative Print Name and Relationship to Patient    ..................................................               ................................................  Date                                            Time    ..........................................................................................................................................  Reviewed by Signature/Title    ...................................................              ..............................................  Date                                                            Time

## 2017-10-31 ENCOUNTER — OFFICE VISIT (OUTPATIENT)
Dept: UROLOGY | Facility: CLINIC | Age: 57
End: 2017-10-31
Payer: COMMERCIAL

## 2017-10-31 VITALS
HEIGHT: 71 IN | WEIGHT: 180 LBS | HEART RATE: 84 BPM | DIASTOLIC BLOOD PRESSURE: 80 MMHG | SYSTOLIC BLOOD PRESSURE: 130 MMHG | BODY MASS INDEX: 25.2 KG/M2

## 2017-10-31 DIAGNOSIS — N20.0 KIDNEY STONE: Primary | ICD-10-CM

## 2017-10-31 PROCEDURE — 99203 OFFICE O/P NEW LOW 30 MIN: CPT | Performed by: PHYSICIAN ASSISTANT

## 2017-10-31 RX ORDER — TAMSULOSIN HYDROCHLORIDE 0.4 MG/1
0.4 CAPSULE ORAL DAILY
Qty: 10 CAPSULE | Refills: 0 | Status: SHIPPED | OUTPATIENT
Start: 2017-10-31 | End: 2017-11-17

## 2017-10-31 ASSESSMENT — PAIN SCALES - GENERAL: PAINLEVEL: MILD PAIN (2)

## 2017-10-31 NOTE — PROGRESS NOTES
CC: Right ureteral stone.    HPI: It is a pleasure to see . Michael S Rosler, a 56 year old male seen today in the urology clinic in consultation from Dr. Spencer for evaluation of the above. This was first detected on CT in the ED on 10/30/17 after the patient presented complaining of acute renal colic symptoms. The stone measures 3 mm and is located in the right UVJ (Also small bilateral intrarenal stones). UA in the ED was negative for infection. BMP revealed Cr to be normal. With pain under good control, the patient was discharged with a prescription for tamsulosin and instructions to follow up with urology.    Currently, the patient reports good pain control; has not taken narcotic since last night. The patient has been straining their urine with no captured stones thus far. Denies gross hematuria, dysuria, fevers, chills, N/V. No prior history of kidney stones.    RECENT IMAGING:  CT ABDOMEN/PELVIS WITH CONTRAST October 30, 2017 2:30 PM      HISTORY: Right lower quadrant pain.     TECHNIQUE: CT of the abdomen and pelvis was performed following the  administration of 91mL Isovue-370. Radiation dose for this scan was  reduced using automated exposure control, adjustment of the mA and/or  kV according to patient size, or iterative reconstruction technique.     COMPARISON: CT of the abdomen/pelvis dated 1/26/2015.     FINDINGS: The appendix appears unremarkable. There are scattered  colonic diverticuli. These are most concentrated in the distal colon.  There is no evidence for acute diverticulitis. The bowel otherwise  appears unremarkable.     There appears to be a 3 mm stone at the base of the bladder near the  right ureterovesicular junction. There is mild right hydronephrosis  and hydroureter. There is a 2 mm nonobstructing stone at the midpole  of the right kidney.     There are parapelvic cysts in the left kidney. There is mild  prominence of the left intrarenal collecting system but no  definite  obstructing stones are seen in the left kidney or left ureter. There  is a 2 mm nonobstructing stone at the lower pole of the left kidney.     The prostate is enlarged in size.     Remaining solid organs in the abdomen appear unremarkable.       IMPRESSION:   1. 3 mm probable obstructing stone in the distal right ureter  contributing to mild right hydroureter and hydronephrosis.  2. 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 There  are parapelvic cysts in the left kidney.  3. Tiny nonobstructing stones in the kidneys as above.  4. Colonic diverticulosis without evidence for acute diverticulitis.       Past Medical History:   Diagnosis Date     Hyperlipidemia      Insomnia      Keratosis seborrheica      Kidney stone      Migraine      Other anxiety states        Past Surgical History:   Procedure Laterality Date     HERNIA REPAIR, INGUINAL RT/LT  3/06    bilateral     VASECTOMY       VASECTOMY         Current Outpatient Prescriptions   Medication Sig Dispense Refill     oxyCODONE (ROXICODONE) 5 MG IR tablet Take 1-2 tablets (5-10 mg) by mouth every 6 hours as needed for pain 24 tablet 0     tamsulosin (FLOMAX) 0.4 MG capsule Take 1 capsule (0.4 mg) by mouth daily for 10 doses 10 capsule 0     ondansetron (ZOFRAN ODT) 4 MG ODT tab Take 1 tablet (4 mg) by mouth every 8 hours as needed for nausea 10 tablet 0     acetaminophen (TYLENOL) 500 MG tablet Take 1 tablet (500 mg) by mouth every 6 hours as needed 20 tablet 0     Colchicine (MITIGARE) 0.6 MG CAPS Take 0.6 mg by mouth 2 times daily 60 capsule 1     PRAVASTATIN SODIUM PO Take 40 mg by mouth daily        Multiple Vitamins-Minerals (MULTIVITAMIN ADULTS PO) Take by mouth daily         No Known Allergies    FAMILY HISTORY: There is no family h/o  malignancy.  There is no family h/o urolithiasis.     SOCIAL HISTORY: The patient does not smoke cigarettes. Denies EtOH and  illicit drug abuse.     ROS: A comprehensive 14 point ROS was obtained and otherwise negative except for that outlined above in the HPI.    GENERAL PHYSICAL EXAM:   Vitals: Stable, afebrile, reviewed in EMR  GENERAL: Well groomed, well developed, well nourished male in NAD.  HEAD: Normocephalic. Atraumatic.  RESPIRATORY: No increased respiratory effort.   GI: Soft, NT  MS: Full ROM in extremities, gait normal, normal muscle tone  SKIN: Warm to touch, dry.  NEURO: Alert and oriented x 3.  PSYCH: Normal mood and affect, pleasant and agreeable during interview and exam.    UA: Unable to provide      ASSESSMENT AND PLAN: 56 year old male with a small right-sided UVJ calculus with associated  hydronephrosis. Symptoms currently controlled. Given size and location of stone, and fact that symptoms are well controlled, it is reasonable to continue with trial of medical expulsive therapy at this time.   - Continue tamsulosin 0.4 mg daily. Refills provided.  - Continue to push fluids. Strain all urine and submit any captured stones for analysis.  - Has oxy for pain.  - Follow up in 2-3 weeks with repeat CT w/o to monitor for stone progression. If the stone passes prior to f/u appointment, no need for KUB.  - Warning signs discussed including fevers, chills, N/V, gross hematuria, severe pain that is refractory to medications which should prompt more urgent evaluation. Patient understands to proceed to the ER should these warning signs occur outside of clinic hours.    During counseling for this visit, we covered the natural history of kidney stones, the risk of progression to symptomatic pain/infection, and the possibility of renal failure/kidney damage.  We covered treatment options including observation, medical expulsive therapy, ureteroscopy/laser/stent.     Standard recommendations on kidney stone prevention were provided. Refer to Neph for recurrent stones.     I have enjoyed participating in the medical care of this  patient and will continue to keep you updated on his progress.  Please do not hesitate to contact me with any questions or concerns.      Petty Schulz PA-C  [facility]    30 minutes were spent with the patient today, > 50% in counseling and coordination of care.

## 2017-10-31 NOTE — LETTER
10/31/2017       RE: Michael S Rosler  [address]  [facility] [address]     Dear Colleague,    Thank you for referring your patient, Michael S Rosler, to the [facility] [place] [facility]. Please see a copy of my visit note below.    CC: Right ureteral stone.    HPI: It is a pleasure to see Mr. Michael S Rosler, a 56 year old male seen today in the urology clinic in consultation from Dr. Spencer for evaluation of the above. This was first detected on CT in the ED on 10/30/17 after the patient presented complaining of acute renal colic symptoms. The stone measures 3 mm and is located in the right UVJ (Also small bilateral intrarenal stones). UA in the ED was negative for infection. BMP revealed Cr to be normal. With pain under good control, the patient was discharged with a prescription for tamsulosin and instructions to follow up with urology.    Currently, the patient reports good pain control; has not taken narcotic since last night. The patient has been straining their urine with no captured stones thus far. Denies gross hematuria, dysuria, fevers, chills, N/V. No prior history of kidney stones.    RECENT IMAGING:  CT ABDOMEN/PELVIS WITH CONTRAST October 30, 2017 2:30 PM      HISTORY: Right lower quadrant pain.     TECHNIQUE: CT of the abdomen and pelvis was performed following the  administration of 91mL Isovue-370. Radiation dose for this scan was  reduced using automated exposure control, adjustment of the mA and/or  kV according to patient size, or iterative reconstruction technique.     COMPARISON: CT of the abdomen/pelvis dated 1/26/2015.     FINDINGS: The appendix appears unremarkable. There are scattered  colonic diverticuli. These are most concentrated in the distal colon.  There is no evidence for acute diverticulitis. The bowel otherwise  appears unremarkable.     There appears to be a 3 mm stone at the base of the bladder  near the  right ureterovesicular junction. There is mild right hydronephrosis  and hydroureter. There is a 2 mm nonobstructing stone at the midpole  of the right kidney.     There are parapelvic cysts in the left kidney. There is mild  prominence of the left intrarenal collecting system but no definite  obstructing stones are seen in the left kidney or left ureter. There  is a 2 mm nonobstructing stone at the lower pole of the left kidney.     The prostate is enlarged in size.     Remaining solid organs in the abdomen appear unremarkable.       IMPRESSION:   1. 3 mm probable obstructing stone in the distal right ureter  contributing to mild right hydroureter and hydronephrosis.  2. 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 There  are parapelvic cysts in the left kidney.  3. Tiny nonobstructing stones in the kidneys as above.  4. Colonic diverticulosis without evidence for acute diverticulitis.       Past Medical History:   Diagnosis Date     Hyperlipidemia      Insomnia      Keratosis seborrheica      Kidney stone      Migraine      Other anxiety states        Past Surgical History:   Procedure Laterality Date     HERNIA REPAIR, INGUINAL RT/LT  3/06    bilateral     VASECTOMY       VASECTOMY         Current Outpatient Prescriptions   Medication Sig Dispense Refill     oxyCODONE (ROXICODONE) 5 MG IR tablet Take 1-2 tablets (5-10 mg) by mouth every 6 hours as needed for pain 24 tablet 0     tamsulosin (FLOMAX) 0.4 MG capsule Take 1 capsule (0.4 mg) by mouth daily for 10 doses 10 capsule 0     ondansetron (ZOFRAN ODT) 4 MG ODT tab Take 1 tablet (4 mg) by mouth every 8 hours as needed for nausea 10 tablet 0     acetaminophen (TYLENOL) 500 MG tablet Take 1 tablet (500 mg) by mouth every 6 hours as needed 20 tablet 0     Colchicine (MITIGARE) 0.6 MG CAPS Take 0.6 mg by mouth 2 times daily 60 capsule 1     PRAVASTATIN SODIUM PO Take 40 mg  by mouth daily        Multiple Vitamins-Minerals (MULTIVITAMIN ADULTS PO) Take by mouth daily         No Known Allergies    FAMILY HISTORY: There is no family h/o  malignancy.  There is no family h/o urolithiasis.     SOCIAL HISTORY: The patient does not smoke cigarettes. Denies EtOH and illicit drug abuse.     ROS: A comprehensive 14 point ROS was obtained and otherwise negative except for that outlined above in the HPI.    GENERAL PHYSICAL EXAM:   Vitals: Stable, afebrile, reviewed in EMR  GENERAL: Well groomed, well developed, well nourished male in NAD.  HEAD: Normocephalic. Atraumatic.  RESPIRATORY: No increased respiratory effort.   GI: Soft, NT  MS: Full ROM in extremities, gait normal, normal muscle tone  SKIN: Warm to touch, dry.  NEURO: Alert and oriented x 3.  PSYCH: Normal mood and affect, pleasant and agreeable during interview and exam.    UA: Unable to provide      ASSESSMENT AND PLAN: 56 year old male with a small right-sided UVJ calculus with associated  hydronephrosis. Symptoms currently controlled. Given size and location of stone, and fact that symptoms are well controlled, it is reasonable to continue with trial of medical expulsive therapy at this time.   - Continue tamsulosin 0.4 mg daily. Refills provided.  - Continue to push fluids. Strain all urine and submit any captured stones for analysis.  - Has oxy for pain.  - Follow up in 2-3 weeks with repeat CT w/o to monitor for stone progression. If the stone passes prior to f/u appointment, no need for KUB.  - Warning signs discussed including fevers, chills, N/V, gross hematuria, severe pain that is refractory to medications which should prompt more urgent evaluation. Patient understands to proceed to the ER should these warning signs occur outside of clinic hours.    During counseling for this visit, we covered the natural history of kidney stones, the risk of progression to symptomatic pain/infection, and the possibility of renal  failure/kidney damage.  We covered treatment options including observation, medical expulsive therapy, ureteroscopy/laser/stent.     Standard recommendations on kidney stone prevention were provided. Refer to Neph for recurrent stones.     I have enjoyed participating in the medical care of this patient and will continue to keep you updated on his progress.  Please do not hesitate to contact me with any questions or concerns.      Petty Schulz PA-C  [facility]    30 minutes were spent with the patient today, > 50% in counseling and coordination of care.

## 2017-10-31 NOTE — PATIENT INSTRUCTIONS
Standard recommendations on kidney stone prevention:  -These include maintaining fluid intake of 3 liters per day or more with a goal of making 2 or more liters of urine per day.  If alcoholic or caffeinated beverages are consumed then you need to drink water along with these beverages to maintain hydration.    -A few ounces of lemon juice concentrate a day diluted in water can help prevent stones.    -Limit intake of red meat, salt, and salty processed foods.    -Maintain calcium intake in diet through continued consumption of dairy products etc.    -Limit foods that are high in oxalate such as spinach, sweet potatoes, dark chocolate, soy products, and some nuts such as peanuts.   -Additional/different recommendations pending any stone analysis.    - CT scan: Please call [phone] to schedule this at [facility]. (please do 1-2 days prior to appt with me)

## 2017-10-31 NOTE — MR AVS SNAPSHOT
After Visit Summary   10/31/2017    Michael S Rosler    MRN: [id number]           Patient Information     Date Of Birth          1960        Visit Information        Provider Department      10/31/2017 3:00 PM Petty Schulz PA-C [facility] [place]        Today's Diagnoses     Kidney stone    -  1      Care Instructions    Standard recommendations on kidney stone prevention:  -These include maintaining fluid intake of 3 liters per day or more with a goal of making 2 or more liters of urine per day.  If alcoholic or caffeinated beverages are consumed then you need to drink water along with these beverages to maintain hydration.    -A few ounces of lemon juice concentrate a day diluted in water can help prevent stones.    -Limit intake of red meat, salt, and salty processed foods.    -Maintain calcium intake in diet through continued consumption of dairy products etc.    -Limit foods that are high in oxalate such as spinach, sweet potatoes, dark chocolate, soy products, and some nuts such as peanuts.   -Additional/different recommendations pending any stone analysis.    - CT scan: Please call [phone] to schedule this at [facility]. (please do 1-2 days prior to appt with me)            Follow-ups after your visit        Your next 10 appointments already scheduled     Nov 16, 2017  7:00 AM CST   CT ABDOMEN PELVIS W/O CONTRAST with SHCT2   [facility] CT ([facility])    [address] [place] [address] [phone]           Please bring any scans or X-rays taken at other hospitals, if similar tests were done. Also bring a list of your medicines, including vitamins, minerals and over-the-counter drugs. It is safest to leave personal items at home.  Be sure to tell your doctor:   If you have any allergies.   If there s any chance you are pregnant.   If you are breastfeeding.   If you have any special  needs.  You may have contrast for this exam. To prepare:   Do not eat or drink for 2 hours before your exam. If you need to take medicine, you may take it with small sips of water. (We may ask you to take liquid medicine as well.)   The day before your exam, drink extra fluids at least six 8-ounce glasses (unless your doctor tells you to restrict your fluids).  Patients over 70 or patients with diabetes or kidney problems:   If you haven t had a blood test (creatinine test) within the last 30 days, go to your clinic or Diagnostic Imaging Department for this test.  If you have diabetes:   If your kidney function is normal, continue taking your metformin (Avandamet, Glucophage, Glucovance, Metaglip) on the day of your exam.   If your kidney function is abnormal, wait 48 hours before restarting this medicine.  You will have oral contrast for this exam:   You will drink the contrast at home. Get this from your clinic or Diagnostic Imaging Department. Please follow the directions given.  Please wear loose clothing, such as a sweat suit or jogging clothes. Avoid snaps, zippers and other metal. We may ask you to undress and put on a hospital gown.  If you have any questions, please call the Imaging Department where you will have your exam.            Nov 17, 2017  3:00 PM CST   Return Sleep Patient with Jean Peoples MD   [facility] [place] ([facility])    [address]  [address]  [facility] [address]   [phone]            Nov 17, 2017  4:00 PM CST   Return Visit with Petty Schulz PA-C   [facility] [place] ([facility])    [address]  [address]  [place] [address]   [phone]            Dec 18, 2017 12:50 PM CST   LAB with [facility]   [facility] ([facility])    [address]  [place] [address] [phone]           Please do not eat  10-12 hours before your appointment if you are coming in fasting for labs on lipids, cholesterol, or glucose (sugar). This does not apply to pregnant women. Water, hot tea and black coffee (with nothing added) are okay. Do not drink other fluids, diet soda or chew gum.            Dec 18, 2017  1:45 PM CST   Return Visit with Christina Bolivar MD   [facility] ([facility])    [address] [place] [address] [address]  [facility] [address] [phone]              Future tests that were ordered for you today     Open Future Orders        Priority Expected Expires Ordered    CT Abdomen Pelvis w/o Contrast Routine 11/21/2017 10/31/2018 10/31/2017            Who to contact     If you have questions or need follow up information about today's clinic visit or your schedule please contact [facility] [place] directly at [phone].  Normal or non-critical lab and imaging results will be communicated to you by [employer], letter or phone within 4 business days after the clinic has received the results. If you do not hear from us within 7 days, please contact the clinic through [email or web address] or phone. If you have a critical or abnormal lab result, we will notify you by phone as soon as possible.  Submit refill requests through [employer] or call your pharmacy and they will forward the refill request to us. Please allow 3 business days for your refill to be completed.          Additional Information About Your Visit        [employer] TRADER[employer]MyDROBE Information     [employer] gives you secure access to your electronic health record. If you see a primary care provider, you can also send messages to your care team and make appointments. If you have questions, please call your primary care clinic.  If you do not have a primary care provider, please call [phone] and they will assist you.        Care EveryWhere ID     This is your Care EveryWhere ID. This could be used by other  "organizations to access your [facility] medical records  [id number]        Your Vitals Were     Pulse Height BMI (Body Mass Index)             84 1.791 m (5' 10.5\") 25.46 kg/m2          Blood Pressure from Last 3 Encounters:   10/31/17 130/80   10/30/17 (!) 149/93   10/26/17 142/88    Weight from Last 3 Encounters:   10/31/17 81.6 kg (180 lb)   10/30/17 81.6 kg (180 lb)   10/26/17 81.8 kg (180 lb 6.4 oz)              We Performed the Following     UA without Microscopic        Primary Care Provider Office Phone # Fax #    Mauricio Chowdary [phone]       [facility] [address]  [facility] [address]        Equal Access to Services     KANDACE GO : Hadii phil francoiso Sodonte, waaxda luqadaha, qaybta kaalmada adeegyada, nica garces . [facility] [phone].    ATENCIÓN: Si habla español, tiene a molina disposición servicios gratuitos de asistencia lingüística. Keerthi al [phone].    We comply with applicable federal civil rights laws and [place] laws. We do not discriminate on the basis of race, color, national origin, age, disability, sex, sexual orientation, or gender identity.            Thank you!     Thank you for choosing [facility] [place]  for your care. Our goal is always to provide you with excellent care. Hearing back from our patients is one way we can continue to improve our services. Please take a few minutes to complete the written survey that you may receive in the mail after your visit with us. Thank you!             Your Updated Medication List - Protect others around you: Learn how to safely use, store and throw away your medicines at www.disposemymeds.org.          This list is accurate as of: 10/31/17  3:34 PM.  Always use your most recent med list.                   Brand Name Dispense Instructions for use Diagnosis    acetaminophen 500 MG tablet    TYLENOL    20 tablet    Take 1 tablet (500 mg) by mouth every " 6 hours as needed        Colchicine 0.6 MG Caps    MITIGARE    60 capsule    Take 0.6 mg by mouth 2 times daily    Pericarditis, unspecified chronicity, unspecified type       MULTIVITAMIN ADULTS PO      Take by mouth daily        ondansetron 4 MG ODT tab    ZOFRAN ODT    10 tablet    Take 1 tablet (4 mg) by mouth every 8 hours as needed for nausea        oxyCODONE 5 MG IR tablet    ROXICODONE    24 tablet    Take 1-2 tablets (5-10 mg) by mouth every 6 hours as needed for pain        PRAVASTATIN SODIUM PO      Take 40 mg by mouth daily        tamsulosin 0.4 MG capsule    FLOMAX    10 capsule    Take 1 capsule (0.4 mg) by mouth daily for 10 doses

## 2017-11-01 NOTE — PROGRESS NOTES
11/1/2017 Message from patient stating he is home doing well, saw Urology yesterday, and started back on colchicine twice a day.    Called to patient - reviewed Dr Martinez plan for patient to remain on colchicine 0.6 once a day until Dr Bolivar returns next week.Reviewed side effects to colchicine with patient. Patient states has history of kidney stone, denies chest pain or abdominal pain at this time, denies other symptoms including shortness of breath. . Patient states understanding and agreement to plan. Messaged to Dr Bolivar for review next week.  Debora Weathers RN 11/01/17 1:30 PM

## 2017-11-02 PROCEDURE — G0399 HOME SLEEP TEST/TYPE 3 PORTA: HCPCS | Performed by: INTERNAL MEDICINE

## 2017-11-02 NOTE — PROCEDURES
"HOME SLEEP STUDY INTERPRETATION    Patient: Michael S Rosler  MRN: [id number]  YOB: 1960  Study Date: 10/26/2017  Referring Provider: Mauricio Chowdary;   Ordering Provider: Jean Peoples MD     Indications for Home Study: Michael S Rosler is a 56 year old male with a history of mild Obstructive Sleep Apnea with MAD who presents with wanting to confirm treatment efficacy of Mandibular Advancement Device.    Estimated body mass index is 25.46 kg/(m^2) as calculated from the following:    Height as of 10/31/17: 1.791 m (5' 10.5\").    Weight as of 10/31/17: 81.6 kg (180 lb).  Total score - [place]: 3 (10/13/2017  8:00 AM)    Data: A full night home sleep study was performed recording the standard physiologic parameters including body position, movement, sound, nasal pressure, thermal oral airflow, chest and abdominal movements with respiratory inductance plethysmography, and oxygen saturation by pulse oximetry. Pulse rate was estimated by oximetry recording. This study was considered adequate based on > 4 hours of quality oximetry and respiratory recording. As specified by the AASM Manual for the Scoring of Sleep and Associated events, version 2.3, Rule VIII.D 1B, 4% oxygen desaturation scoring for hypopneas is used as a standard of care on all home sleep apnea testing.    Analysis Time:  378 minutes    Respiration:   Sleep Associated Hypoxemia: sustained hypoxemia was present. Baseline oxygen saturation was 91%.  Time with saturation less than or equal to 88% was 5.3 minutes. The lowest oxygen saturation was 87%.   Snoring: Snoring was present.  Respiratory events: The home study revealed a presence of 24 obstructive apneas and 37 mixed and central apneas. There were 44 hypopneas resulting in a combined apnea/hypopnea index [AHI] of 16.7 events per hour.  AHI was 19.1 per hour supine, - per hour prone, - per hour on left side, and 4.7 per hour on right side.   Pattern: Excluding events noted above, " respiratory rate and pattern was Normal.    Position: Percent of time spent: supine - 82.4%, prone - 0%, on left - 0%, on right - 16.8%.    Heart Rate: By pulse oximetry normal rate was noted.     Assessment:   Moderate residual obstructive sleep apnea.  Sleep associated hypoxemia was present.    Recommendations:  Consider auto-CPAP at 5-15 cmH2O, positional therapy, polysomnography with full night PAP titration, surgical options or further advancement of oral appliance therapy.  Suggest optimizing sleep hygiene and avoiding sleep deprivation.  Weight management.    Diagnosis Code(s): Obstructive Sleep Apnea G47.33, Hypoxemia G47.36    Jean Peoples MD, November 2, 2017   Diplomate, American Board of Internal Medicine, Sleep Medicine

## 2017-11-06 DIAGNOSIS — N20.0 CALCULUS OF KIDNEY: Primary | ICD-10-CM

## 2017-11-06 PROCEDURE — 82365 CALCULUS SPECTROSCOPY: CPT | Mod: 90 | Performed by: PHYSICIAN ASSISTANT

## 2017-11-06 PROCEDURE — 99000 SPECIMEN HANDLING OFFICE-LAB: CPT | Performed by: PHYSICIAN ASSISTANT

## 2017-11-06 NOTE — PROGRESS NOTES
Pt called and LVM inquiring about update from Dr. Bolivar. Pt states he knows that Dr. Bolivar is not in [facility] office today, so if we do not hear back until tomorrow from her that's fine. He just wanted to check in and make sure we review with Dr. Bolivar if he should increase Colchicine back to twice a day. Will await Dr. Bolivar's review. SHANTELLE Zavala 3:01 PM 11/06/17

## 2017-11-07 NOTE — PROGRESS NOTES
Dr Bolivar reviewed - plan below - called update to patient. Reviewed in detail - patient states pain is still mild at a 1 to 2 on pain scale 1-10 but notes better than at visit. Patient states he has been taking it easy and not exerting himself. Patient will call if questions or concerns arise prior to December visit.  Debora Weathers RN 11/07/17 2:17 PM    Please update patient - Okay to remain on once daily colchicine due to side effects of diarrhea. If he starts getting recurrent pericarditic chest pain, re-attempt increasing to two times daily. I will see him as scheduled in December. Thanks. FERNANDO

## 2017-11-08 LAB
APPEARANCE STONE: NORMAL
COMPN STONE: NORMAL
NUMBER STONE: 1
SIZE STONE: NORMAL MM
WT STONE: 19 MG

## 2017-11-17 ENCOUNTER — OFFICE VISIT (OUTPATIENT)
Dept: SLEEP MEDICINE | Facility: CLINIC | Age: 57
End: 2017-11-17
Payer: COMMERCIAL

## 2017-11-17 VITALS
DIASTOLIC BLOOD PRESSURE: 78 MMHG | SYSTOLIC BLOOD PRESSURE: 112 MMHG | RESPIRATION RATE: 16 BRPM | HEART RATE: 85 BPM | WEIGHT: 177.6 LBS | OXYGEN SATURATION: 96 % | BODY MASS INDEX: 24.86 KG/M2 | HEIGHT: 71 IN

## 2017-11-17 DIAGNOSIS — G47.33 OSA (OBSTRUCTIVE SLEEP APNEA): ICD-10-CM

## 2017-11-17 PROCEDURE — 99213 OFFICE O/P EST LOW 20 MIN: CPT | Performed by: INTERNAL MEDICINE

## 2017-11-17 NOTE — MR AVS SNAPSHOT
After Visit Summary   11/17/2017    Michael S Rosler    MRN: [id number]           Patient Information     Date Of Birth          1960        Visit Information        Provider Department      11/17/2017 3:00 PM Jean Peoples MD [facility]        Today's Diagnoses     RAFAEL (obstructive sleep apnea)          Care Instructions    You still have moderate sleep apnea despite using the mouthguard.  Your sleep apnea is much worse on your back.  To address this, options would include:  - Further advancement of oral appliance.   If you decide to go this route, go ahead and advance your device as you were instructed by Dr. Carney.  After getting to where you think you want to be we should retest you with another Home Sleep Apnea Test.   - Combination of positional therapy and oral appliance.     Night Shift   Zzoma   Rematee   Slumberbump  - Reconsideration of CPAP therapy.  - Surgical evaluation with ENT surgeon.          Follow-ups after your visit        Your next 10 appointments already scheduled     Dec 18, 2017 12:50 PM CST   LAB with [facility]   [facility] ([facility])    [address] [place] [address]   [phone]           Please do not eat 10-12 hours before your appointment if you are coming in fasting for labs on lipids, cholesterol, or glucose (sugar). This does not apply to pregnant women. Water, hot tea and black coffee (with nothing added) are okay. Do not drink other fluids, diet soda or chew gum.            Dec 18, 2017  1:45 PM CST   Return Visit with Christina Bolivar MD   [facility] ([facility])    [address] [place] [address]   [phone]              Future tests that were ordered for you today     Open Future Orders        Priority Expected Expires Ordered    HST-Home Sleep Apnea Test Routine  5/19/2018  "11/17/2017            Who to contact     If you have questions or need follow up information about today's clinic visit or your schedule please contact [facility] [place] directly at [phone].  Normal or non-critical lab and imaging results will be communicated to you by MyChart, letter or phone within 4 business days after the clinic has received the results. If you do not hear from us within 7 days, please contact the clinic through MyChart or phone. If you have a critical or abnormal lab result, we will notify you by phone as soon as possible.  Submit refill requests through [employer] or call your pharmacy and they will forward the refill request to us. Please allow 3 business days for your refill to be completed.          Additional Information About Your Visit        [employer] Information     [employer] gives you secure access to your electronic health record. If you see a primary care provider, you can also send messages to your care team and make appointments. If you have questions, please call your primary care clinic.  If you do not have a primary care provider, please call [phone] and they will assist you.        Care EveryWhere ID     This is your Care EveryWhere ID. This could be used by other organizations to access your [facility] medical records  [id number]        Your Vitals Were     Pulse Respirations Height Pulse Oximetry BMI (Body Mass Index)       85 16 1.791 m (5' 10.5\") 96% 25.12 kg/m2        Blood Pressure from Last 3 Encounters:   11/17/17 112/78   10/31/17 130/80   10/30/17 (!) 149/93    Weight from Last 3 Encounters:   11/17/17 80.6 kg (177 lb 9.6 oz)   10/31/17 81.6 kg (180 lb)   10/30/17 81.6 kg (180 lb)               Primary Care Provider Office Phone # Fax #    Mauricio Chowdary [phone]       [facility] [address]  [facility] [address]        Equal Access to Services     MISTI GO AH: Hadii phil jimenez Sodonte, waaxda luqadaha, qaybta kaalmada " nica alarconscottysamia la'aan ah. [facility] [phone].    ATENCIÓN: Si habla hien, tiene a molina disposición servicios gratuitos de asistencia lingüística. Keerthi al [phone].    We comply with applicable federal civil rights laws and [place] laws. We do not discriminate on the basis of race, color, national origin, age, disability, sex, sexual orientation, or gender identity.            Thank you!     Thank you for choosing [facility]  for your care. Our goal is always to provide you with excellent care. Hearing back from our patients is one way we can continue to improve our services. Please take a few minutes to complete the written survey that you may receive in the mail after your visit with us. Thank you!             Your Updated Medication List - Protect others around you: Learn how to safely use, store and throw away your medicines at www.disposemymeds.org.          This list is accurate as of: 11/17/17  3:32 PM.  Always use your most recent med list.                   Brand Name Dispense Instructions for use Diagnosis    Colchicine 0.6 MG Caps    MITIGARE    60 capsule    Take 0.6 mg by mouth 2 times daily    Pericarditis, unspecified chronicity, unspecified type       MULTIVITAMIN ADULTS PO      Take by mouth daily        PRAVASTATIN SODIUM PO      Take 40 mg by mouth daily

## 2017-11-17 NOTE — PROGRESS NOTES
Michael S Rosler presents for follow up of Home Sleep Apnea Testing for determination of efficacy of current position of Mandibular Advancement Device.    On 2010 MSI Polysomnography, had only 20 - 30% of night supine, but did visually appear to have much more severe supine Obstructive Sleep Apnea (no positional metrics reported).  On recent Home Sleep Apnea Testing, most of the night (82%) was supine, which may explain significant increase in residual AHI despite oral appliance.    You still have moderate sleep apnea despite using the mouthguard.  Your sleep apnea is much worse on your back.  To address this, options would include:  - Further advancement of oral appliance.   If you decide to go this route, go ahead and advance your device as you were instructed by Dr. Carney.  After getting to where you think you want to be we should retest you with another Home Sleep Apnea Test.   - Combination of positional therapy and oral appliance.     Night Shift   Zzoma   Rematee   Slumberbump  - Reconsideration of CPAP therapy.  - Surgical evaluation with ENT surgeon.    Total time of care was 15 minutes, with > 50% of time spent on counseling and coordination of care.    Jean Peoples MD, Sleep Physician  Nov 17, 2017

## 2017-11-17 NOTE — PATIENT INSTRUCTIONS
You still have moderate sleep apnea despite using the mouthguard.  Your sleep apnea is much worse on your back.  To address this, options would include:  - Further advancement of oral appliance.   If you decide to go this route, go ahead and advance your device as you were instructed by Dr. Carney.  After getting to where you think you want to be we should retest you with another Home Sleep Apnea Test.   - Combination of positional therapy and oral appliance.     Night Shift   Zzoma   Rematee   Slumberbump  - Reconsideration of CPAP therapy.  - Surgical evaluation with ENT surgeon.

## 2017-11-17 NOTE — LETTER
11/17/2017         RE: Michael S Rosler  [address]  [place] [address]        Dear Colleague,    Thank you for referring your patient, Michael S Rosler, to the [facility]. Please see a copy of my visit note below.    Michael S Rosler presents for follow up of Home Sleep Apnea Testing for determination of efficacy of current position of Mandibular Advancement Device.    On 2010 MSI Polysomnography, had only 20 - 30% of night supine, but did visually appear to have much more severe supine Obstructive Sleep Apnea (no positional metrics reported).  On recent Home Sleep Apnea Testing, most of the night (82%) was supine, which may explain significant increase in residual AHI despite oral appliance.    You still have moderate sleep apnea despite using the mouthguard.  Your sleep apnea is much worse on your back.  To address this, options would include:  - Further advancement of oral appliance.   If you decide to go this route, go ahead and advance your device as you were instructed by Dr. Carney.  After getting to where you think you want to be we should retest you with another Home Sleep Apnea Test.   - Combination of positional therapy and oral appliance.     Night Shift   Zzoma   Rematee   Slumberbump  - Reconsideration of CPAP therapy.  - Surgical evaluation with ENT surgeon.    Total time of care was 15 minutes, with > 50% of time spent on counseling and coordination of care.    Jean Peoples MD, Sleep Physician  Nov 17, 2017         Again, thank you for allowing me to participate in the care of your patient.        Sincerely,        Jean Peoples MD

## 2017-11-17 NOTE — NURSING NOTE
"Chief Complaint   Patient presents with     Study Results     HST f/u       Initial /78  Pulse 85  Resp 16  Ht 1.791 m (5' 10.5\")  Wt 80.6 kg (177 lb 9.6 oz)  SpO2 96%  BMI 25.12 kg/m2 Estimated body mass index is 25.12 kg/(m^2) as calculated from the following:    Height as of this encounter: 1.791 m (5' 10.5\").    Weight as of this encounter: 80.6 kg (177 lb 9.6 oz).  Medication Reconciliation: complete     ESS 3  Codie Beavers  "

## 2017-11-20 NOTE — PROGRESS NOTES
Copy of Home Sleep Test and interpretation faxed to Dr. Carney's office at [phone] on 11/20/2017.    Shanon Mathur  Sleep Clinic - Specialist

## 2017-12-01 ENCOUNTER — CARE COORDINATION (OUTPATIENT)
Dept: CARDIOLOGY | Facility: CLINIC | Age: 57
End: 2017-12-01

## 2017-12-01 DIAGNOSIS — I31.9 PERICARDITIS: Primary | ICD-10-CM

## 2017-12-01 NOTE — PROGRESS NOTES
12/1/2017 Call from patient wit update prior to visit with Dr Bolivar 12/18/2017. He remains on 1 tablet colchicine daily and notes his chest discomfort remains unchanged at 2 or 3 on pain scale from 1-10. Pain is present most of the time, not improved over time on colchicine, he wanted to provide Dr Bolivar with an update in case she has further recommendations prior to visit.  Debora Weathers RN 12/01/17 1:34 PM

## 2017-12-05 NOTE — PROGRESS NOTES
Christina Bolivar MD McMahon, Bullis Mary, RN        Caller: Unspecified (4 days ago,  1:31 PM)                     Please check with patient and if agreeable, have him do a cardiac MRI, and lab (CBC, BMP, sed rate, CRP), ECG prior to return visit.     Thanks          12/5/2017 Called to patient with Dr Bolivar plan for testing as above prior to visit 12/18/2017. Patient states agreement and understanding to plan. Transferred to scheduling to arrange.    Debora Weathers RN 12/05/17 1:26 PM

## 2017-12-12 ENCOUNTER — HOSPITAL ENCOUNTER (OUTPATIENT)
Dept: CARDIOLOGY | Facility: CLINIC | Age: 57
Discharge: HOME | End: 2017-12-12
Attending: INTERNAL MEDICINE | Admitting: INTERNAL MEDICINE
Payer: COMMERCIAL

## 2017-12-12 DIAGNOSIS — I31.9 PERICARDITIS: ICD-10-CM

## 2017-12-12 PROCEDURE — 75561 CARDIAC MRI FOR MORPH W/DYE: CPT | Mod: 26 | Performed by: INTERNAL MEDICINE

## 2017-12-12 PROCEDURE — 75561 CARDIAC MRI FOR MORPH W/DYE: CPT

## 2017-12-12 PROCEDURE — 25000128 H RX IP 250 OP 636: Performed by: INTERNAL MEDICINE

## 2017-12-12 PROCEDURE — A9585 GADOBUTROL INJECTION: HCPCS | Performed by: INTERNAL MEDICINE

## 2017-12-12 RX ORDER — ONDANSETRON 2 MG/ML
4 INJECTION INTRAMUSCULAR; INTRAVENOUS
Status: DISCONTINUED | OUTPATIENT
Start: 2017-12-12 | End: 2017-12-13 | Stop reason: HOSPADM

## 2017-12-12 RX ORDER — GADOBUTROL 604.72 MG/ML
5-65 INJECTION INTRAVENOUS ONCE
Status: COMPLETED | OUTPATIENT
Start: 2017-12-12 | End: 2017-12-12

## 2017-12-12 RX ORDER — ACYCLOVIR 200 MG/1
0-1 CAPSULE ORAL
Status: DISCONTINUED | OUTPATIENT
Start: 2017-12-12 | End: 2017-12-13 | Stop reason: HOSPADM

## 2017-12-12 RX ORDER — METHYLPREDNISOLONE SODIUM SUCCINATE 125 MG/2ML
125 INJECTION, POWDER, LYOPHILIZED, FOR SOLUTION INTRAMUSCULAR; INTRAVENOUS
Status: DISCONTINUED | OUTPATIENT
Start: 2017-12-12 | End: 2017-12-13 | Stop reason: HOSPADM

## 2017-12-12 RX ORDER — DIPHENHYDRAMINE HYDROCHLORIDE 50 MG/ML
25-50 INJECTION INTRAMUSCULAR; INTRAVENOUS
Status: DISCONTINUED | OUTPATIENT
Start: 2017-12-12 | End: 2017-12-13 | Stop reason: HOSPADM

## 2017-12-12 RX ORDER — DIPHENHYDRAMINE HCL 25 MG
25 CAPSULE ORAL
Status: DISCONTINUED | OUTPATIENT
Start: 2017-12-12 | End: 2017-12-13 | Stop reason: HOSPADM

## 2017-12-12 RX ORDER — DIAZEPAM 5 MG
5 TABLET ORAL EVERY 30 MIN PRN
Status: DISCONTINUED | OUTPATIENT
Start: 2017-12-12 | End: 2017-12-13 | Stop reason: HOSPADM

## 2017-12-12 RX ADMIN — GADOBUTROL 11 ML: 604.72 INJECTION INTRAVENOUS at 08:59

## 2017-12-18 ENCOUNTER — OFFICE VISIT (OUTPATIENT)
Dept: CARDIOLOGY | Facility: CLINIC | Age: 57
End: 2017-12-18
Attending: INTERNAL MEDICINE
Payer: COMMERCIAL

## 2017-12-18 VITALS
SYSTOLIC BLOOD PRESSURE: 142 MMHG | DIASTOLIC BLOOD PRESSURE: 90 MMHG | HEIGHT: 71 IN | WEIGHT: 181.2 LBS | OXYGEN SATURATION: 97 % | HEART RATE: 76 BPM | BODY MASS INDEX: 25.37 KG/M2

## 2017-12-18 DIAGNOSIS — I31.9 PERICARDITIS: Primary | ICD-10-CM

## 2017-12-18 DIAGNOSIS — Z86.79 HISTORY OF PERICARDITIS: Primary | ICD-10-CM

## 2017-12-18 DIAGNOSIS — I31.9 PERICARDITIS: ICD-10-CM

## 2017-12-18 LAB
ANION GAP SERPL CALCULATED.3IONS-SCNC: 12.3 MMOL/L (ref 6–17)
BASOPHILS # BLD AUTO: 0 10E9/L (ref 0–0.2)
BASOPHILS NFR BLD AUTO: 0.5 %
BUN SERPL-MCNC: 27 MG/DL (ref 7–30)
CALCIUM SERPL-MCNC: 9.7 MG/DL (ref 8.5–10.5)
CHLORIDE SERPL-SCNC: 106 MMOL/L (ref 98–107)
CO2 SERPL-SCNC: 29 MMOL/L (ref 23–29)
CREAT SERPL-MCNC: 1.25 MG/DL (ref 0.7–1.3)
CRP SERPL-MCNC: <2.9 MG/L (ref 0–8)
DIFFERENTIAL METHOD BLD: NORMAL
EOSINOPHIL # BLD AUTO: 0.1 10E9/L (ref 0–0.7)
EOSINOPHIL NFR BLD AUTO: 2.2 %
ERYTHROCYTE [DISTWIDTH] IN BLOOD BY AUTOMATED COUNT: 13 % (ref 10–15)
ERYTHROCYTE [SEDIMENTATION RATE] IN BLOOD BY WESTERGREN METHOD: 8 MM/H (ref 0–20)
GFR SERPL CREATININE-BSD FRML MDRD: 60 ML/MIN/1.7M2
GLUCOSE SERPL-MCNC: 103 MG/DL (ref 70–105)
HCT VFR BLD AUTO: 42.5 % (ref 40–53)
HGB BLD-MCNC: 14.3 G/DL (ref 13.3–17.7)
IMM GRANULOCYTES # BLD: 0.1 10E9/L (ref 0–0.4)
IMM GRANULOCYTES NFR BLD: 1.2 %
LYMPHOCYTES # BLD AUTO: 1.8 10E9/L (ref 0.8–5.3)
LYMPHOCYTES NFR BLD AUTO: 30.2 %
MCH RBC QN AUTO: 30.9 PG (ref 26.5–33)
MCHC RBC AUTO-ENTMCNC: 33.6 G/DL (ref 31.5–36.5)
MCV RBC AUTO: 92 FL (ref 78–100)
MONOCYTES # BLD AUTO: 0.5 10E9/L (ref 0–1.3)
MONOCYTES NFR BLD AUTO: 8.1 %
NEUTROPHILS # BLD AUTO: 3.4 10E9/L (ref 1.6–8.3)
NEUTROPHILS NFR BLD AUTO: 57.8 %
NRBC # BLD AUTO: 0 10*3/UL
NRBC BLD AUTO-RTO: 0 /100
PLATELET # BLD AUTO: 216 10E9/L (ref 150–450)
POTASSIUM SERPL-SCNC: 4.3 MMOL/L (ref 3.5–5.1)
RBC # BLD AUTO: 4.63 10E12/L (ref 4.4–5.9)
SODIUM SERPL-SCNC: 143 MMOL/L (ref 136–145)
WBC # BLD AUTO: 5.8 10E9/L (ref 4–11)

## 2017-12-18 PROCEDURE — 86140 C-REACTIVE PROTEIN: CPT | Performed by: INTERNAL MEDICINE

## 2017-12-18 PROCEDURE — 85652 RBC SED RATE AUTOMATED: CPT | Performed by: INTERNAL MEDICINE

## 2017-12-18 PROCEDURE — 85025 COMPLETE CBC W/AUTO DIFF WBC: CPT | Performed by: INTERNAL MEDICINE

## 2017-12-18 PROCEDURE — 36415 COLL VENOUS BLD VENIPUNCTURE: CPT | Performed by: INTERNAL MEDICINE

## 2017-12-18 PROCEDURE — 93005 ELECTROCARDIOGRAM TRACING: CPT | Performed by: INTERNAL MEDICINE

## 2017-12-18 PROCEDURE — 80048 BASIC METABOLIC PNL TOTAL CA: CPT | Performed by: INTERNAL MEDICINE

## 2017-12-18 PROCEDURE — 99214 OFFICE O/P EST MOD 30 MIN: CPT | Mod: 25 | Performed by: INTERNAL MEDICINE

## 2017-12-18 NOTE — LETTER
12/18/2017      Mauricio Chowdary MD  [employer] [address]  [facility] [address]      RE: Michael S Rosler       Dear Colleague,    I had the pleasure of seeing Michael S Rosler in the [facility].    LOCATION [facility] HEART CARE [facility]      PRIMARY PROVIDER:  Muaricio Chowdary MD      REASON FOR VISIT:  Followup of testing done to evaluate chronic pericarditis.      HISTORY OF PRESENT ILLNESS:    Mr. Rosler is a delightful 56-year-old  gentleman who is known to me from previous visits.  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 He is a slim gentleman, avid exerciser, has never used tobacco products, does not drink alcohol or caffeine and has mild hyperlipidemia (on 40 mg of atorvastatin) and mild sleep apnea pan (corrected with an oral appliance).  Apart from intermittent mild migraine headaches and a history of renal stones, he is in excellent baseline health.     The reason I am seeing is for nonspecific palpitations with unrevealing testing.  He also had an episode of acute pericarditis on 09/28/2017 which was treated with a combination of ibuprofen and colchicine.        1.  Acute pericarditis.      Initial episode on 09/28/2017.  Treated with a combination of colchicine and ibuprofen.  On his last visit with me on 10/26/2017, I had increased his colchicine to 0.6 mg b.i.d. while discontinuing ibuprofen.  Within a couple of days, he had an episode of renal stones, so he remained on 0.6 mg once daily. Overall, he has done well without recurrent chest pain or palpitations.  He has slowly returned to exercising, albeit without any cardio.  At baseline, he is an avid runner.  He had a followup cardiac MRI on 12/12/2017 which was completely normal with an LVEF of 66% without evidence of pericardial thickening or enhancement.  Likewise, his CRP from earlier today is less than 2.9, hemoglobin 14.3, leukocytes 5.8.  However, his creatinine has gone up slightly  from baseline of 0.9/1.0 to 1.25, BUN 27, sodium 143, potassium 4.3.      CURRENT MEDICATIONS:   1.  Colchicine 0.6 mg daily.   2.  Pravastatin 40 mg daily.   3.  Daily multivitamin.      ALLERGIES:  No known allergies.      VITAL SIGNS:  /90 (slightly elevated today, but at baseline is normal), pulse 76 per minute and regular, height 1.791 meters, weight 82.2 kg.  Respiratory rate 12 per minute, saturations 97% on room air, BMI 25.6 kg/m2.      DIAGNOSES:     1.  Single episode of acute pericarditis on 09/28/2017 with complete symptomatic resolution, normal cardiac MRI, normal inflammatory markers.   2.  Treated hyperlipidemia.      RECOMMENDATIONS:   1.  Don is doing very well, asymptomatic.  Inflammatory markers and cardiac MRI reassuring.  Therefore, discontinue colchicine.  Follow up as needed with Cardiology as needed.   2.  His creatinine has gone up slightly.  This is in the context of recent NSAID use and kidney stones.  He is going to see his primary provider, Dr. Chowdary, in a month for a routine physical examination.  I suggested he follow up a basic metabolic panel at that stage.      It was a pleasure to visit with the patient.  I spent 25 minutes with him, greater than 50% of the time was spent in counseling and coordination of care.       Outpatient Encounter Prescriptions as of 12/18/2017   Medication Sig Dispense Refill     COLCHICINE PO Take 0.6 mg by mouth daily       PRAVASTATIN SODIUM PO Take 40 mg by mouth daily        Multiple Vitamins-Minerals (MULTIVITAMIN ADULTS PO) Take by mouth daily       [DISCONTINUED] Colchicine (MITIGARE) 0.6 MG CAPS Take 0.6 mg by mouth 2 times daily (Patient taking differently: Take 0.6 mg by mouth daily ) 60 capsule 1     No facility-administered encounter medications on file as of 12/18/2017.        Again, thank you for allowing me to participate in the care of your patient.      Sincerely,    Christina Bolivar MD     [facility]  Heart Care

## 2017-12-18 NOTE — MR AVS SNAPSHOT
After Visit Summary   12/18/2017    Michael S Rosler    MRN: [id number]           Patient Information     Date Of Birth          1960        Visit Information        Provider Department      12/18/2017 1:45 PM Christina Bolivar MD [facility]        Today's Diagnoses     History of pericarditis    -  1      Care Instructions    1. Stop colchicine.    2. Follow up with primary doctor with a basic metabolic panel (kidney labs).    3. Follow up with cardiology as needed.    If you have any questions or concerns, please call my nurse Debora Sainz at [phone].            Follow-ups after your visit        Your next 10 appointments already scheduled     Dec 21, 2017  1:00 PM CST   HST  with BED 7 SH SLEEP   [facility] ([facility])    [address] [id number] [phone]            Dec 22, 2017  8:00 AM CST   HST Drop Off with BED 7 SH SLEEP   [facility] ([facility])    [address] [id number] [phone]            Dec 22, 2017  8:30 AM CST   Return Sleep Patient with Jean Peoples MD   [facility] ([facility])    [address] [id number] [phone]              Who to contact     If you have questions or need follow up information about today's clinic visit or your schedule please contact [facility] directly at [phone].  Normal or non-critical lab and imaging results will be communicated to you by MyChart, letter or phone within 4 business days after the clinic has received the results. If you do not hear from us within 7 days, please contact the clinic through MyChart or phone. If you have a critical or abnormal lab result, we will notify you by phone as soon as  "possible.  Submit refill requests through CardSpring or call your pharmacy and they will forward the refill request to us. Please allow 3 business days for your refill to be completed.          Additional Information About Your Visit        appeningharExecutive Caddie Information     CardSpring gives you secure access to your electronic health record. If you see a primary care provider, you can also send messages to your care team and make appointments. If you have questions, please call your primary care clinic.  If you do not have a primary care provider, please call [phone] and they will assist you.        Care EveryWhere ID     This is your Care EveryWhere ID. This could be used by other organizations to access your [place] medical records  [id number]        Your Vitals Were     Pulse Height Pulse Oximetry BMI (Body Mass Index)          76 1.791 m (5' 10.5\") 97% 25.63 kg/m2         Blood Pressure from Last 3 Encounters:   12/18/17 142/90   11/17/17 112/78   10/31/17 130/80    Weight from Last 3 Encounters:   12/18/17 82.2 kg (181 lb 3.2 oz)   11/17/17 80.6 kg (177 lb 9.6 oz)   10/31/17 81.6 kg (180 lb)              We Performed the Following     EKG 12-lead complete w/read - Clinics     Follow-Up with Cardiologist          Today's Medication Changes          These changes are accurate as of: 12/18/17  2:24 PM.  If you have any questions, ask your nurse or doctor.               These medicines have changed or have updated prescriptions.        Dose/Directions    COLCHICINE PO   This may have changed:  Another medication with the same name was removed. Continue taking this medication, and follow the directions you see here.   Changed by:  Christina Bolivar MD        Dose:  0.6 mg   Take 0.6 mg by mouth daily   Refills:  0                Primary Care Provider Office Phone # Fax #    Mauricio Chowdary [phone]       [employer] [facility] [address]  [facility] [address]        Equal Access to Services     MISTI GO " AH: Christianne jimenez Travisali, waasadda luqadaha, qacliffta kababatunde samanglonica hayjose m lawscottysamia sanchez. So [facility] [phone].    ATENCIÓN: Si habla español, tiene a molina disposición servicios gratuitos de asistencia lingüística. Llame al [phone].    We comply with applicable federal civil rights laws and [place] laws. We do not discriminate on the basis of race, color, national origin, age, disability, sex, sexual orientation, or gender identity.            Thank you!     Thank you for choosing [facility] HEART [facility]  for your care. Our goal is always to provide you with excellent care. Hearing back from our patients is one way we can continue to improve our services. Please take a few minutes to complete the written survey that you may receive in the mail after your visit with us. Thank you!             Your Updated Medication List - Protect others around you: Learn how to safely use, store and throw away your medicines at www.disposemymeds.org.          This list is accurate as of: 12/18/17  2:24 PM.  Always use your most recent med list.                   Brand Name Dispense Instructions for use Diagnosis    COLCHICINE PO      Take 0.6 mg by mouth daily        MULTIVITAMIN ADULTS PO      Take by mouth daily        PRAVASTATIN SODIUM PO      Take 40 mg by mouth daily

## 2017-12-18 NOTE — PATIENT INSTRUCTIONS
1. Stop colchicine.    2. Follow up with primary doctor with a basic metabolic panel (kidney labs).    3. Follow up with cardiology as needed.    If you have any questions or concerns, please call my nurse Debora Sainz at [phone].

## 2017-12-18 NOTE — PROGRESS NOTES
LOCATION [facility] HEART CARE [facility]      PRIMARY PROVIDER:  Mauricio Chowdary MD      REASON FOR VISIT:  Followup of testing done to evaluate chronic pericarditis.      HISTORY OF PRESENT ILLNESS:    Mr. Rosler is a delightful 56-year-old  gentleman who is known to me from previous visits.  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 He is a slim gentleman, avid exerciser, has never used tobacco products, does not drink alcohol or caffeine and has mild hyperlipidemia (on 40 mg of atorvastatin) and mild sleep apnea pan (corrected with an oral appliance).  Apart from intermittent mild migraine headaches and a history of renal stones, he is in excellent baseline health.     The reason I am seeing is for nonspecific palpitations with unrevealing testing.  He also had an episode of acute pericarditis on 09/28/2017 which was treated with a combination of ibuprofen and colchicine.        1.  Acute pericarditis.      Initial episode on 09/28/2017.  Treated with a combination of colchicine and ibuprofen.  On his last visit with me on 10/26/2017, I had increased his colchicine to 0.6 mg b.i.d. while discontinuing ibuprofen.  Within a couple of days, he had an episode of renal stones, so he remained on 0.6 mg once daily. Overall, he has done well without recurrent chest pain or palpitations.  He has slowly returned to exercising, albeit without any cardio.  At baseline, he is an avid runner.  He had a followup cardiac MRI on 12/12/2017 which was completely normal with an LVEF of 66% without evidence of pericardial thickening or enhancement.  Likewise, his CRP from earlier today is less than 2.9, hemoglobin 14.3, leukocytes 5.8.  However, his creatinine has gone up slightly from baseline of 0.9/1.0 to 1.25, BUN 27, sodium 143, potassium 4.3.      CURRENT MEDICATIONS:   1.  Colchicine 0.6 mg daily.   2.  Pravastatin 40 mg daily.   3.  Daily multivitamin.      ALLERGIES:  No known allergies.      VITAL  SIGNS:  /90 (slightly elevated today, but at baseline is normal), pulse 76 per minute and regular, height 1.791 meters, weight 82.2 kg.  Respiratory rate 12 per minute, saturations 97% on room air, BMI 25.6 kg/m2.      DIAGNOSES:     1.  Single episode of acute pericarditis on 2017 with complete symptomatic resolution, normal cardiac MRI, normal inflammatory markers.   2.  Treated hyperlipidemia.      RECOMMENDATIONS:   1.  Halie is doing very well, asymptomatic.  Inflammatory markers and cardiac MRI reassuring.  Therefore, discontinue colchicine.  Follow up as needed with Cardiology as needed.   2.  His creatinine has gone up slightly.  This is in the context of recent NSAID use and kidney stones.  He is going to see his primary provider, Dr. Chowdary, in a month for a routine physical examination.  I suggested he follow up a basic metabolic panel at that stage.      It was a pleasure to visit with the patient.  I spent 25 minutes with him, greater than 50% of the time was spent in counseling and coordination of care.         AMAN DE ANDA MD             D: 2017 14:36   T: 2017 16:49   MT: CHAPARRITA      Name:     ROSLER, MICHAEL   MRN:      [id number]        Account:      [id number]   :      1960           Service Date: 2017      Document: [id number]

## 2017-12-21 ENCOUNTER — OFFICE VISIT (OUTPATIENT)
Dept: SLEEP MEDICINE | Facility: CLINIC | Age: 57
End: 2017-12-21
Payer: COMMERCIAL

## 2017-12-21 DIAGNOSIS — G47.33 OSA (OBSTRUCTIVE SLEEP APNEA): ICD-10-CM

## 2017-12-21 NOTE — PROGRESS NOTES
Patient instructed on HST use. Patient demonstrated and verbalized knowledge of use. Device programmed to start at 11pm. Device will be returned tomorrow before noon.    Shanon Mathur  Sleep Clinic - Specialist

## 2017-12-21 NOTE — MR AVS SNAPSHOT
After Visit Summary   12/21/2017    Michael S Rosler    MRN: [id number]           Patient Information     Date Of Birth          1960        Visit Information        Provider Department      12/21/2017 1:00 PM BED 7 SH SLEEP [facility]        Today's Diagnoses     RAFAEL (obstructive sleep apnea)           Follow-ups after your visit        Your next 10 appointments already scheduled     Dec 22, 2017  8:00 AM CST   HST Drop Off with BED 7 SH SLEEP   [facility] ([facility])    [address]  [facility] [id number] [phone]            Dec 22, 2017  8:30 AM CST   Return Sleep Patient with Jean Peoples MD   [facility] ([facility])    [address] [address] [phone]              Who to contact     If you have questions or need follow up information about today's clinic visit or your schedule please contact [facility] directly at [phone].  Normal or non-critical lab and imaging results will be communicated to you by Emergent Viewshart, letter or phone within 4 business days after the clinic has received the results. If you do not hear from us within 7 days, please contact the clinic through Urbantecht or phone. If you have a critical or abnormal lab result, we will notify you by phone as soon as possible.  Submit refill requests through hurleypalmerflatt or call your pharmacy and they will forward the refill request to us. Please allow 3 business days for your refill to be completed.          Additional Information About Your Visit        Emergent Viewshart Information     hurleypalmerflatt gives you secure access to your electronic health record. If you see a primary care provider, you can also send messages to your care team and make appointments. If you have questions, please call your primary care clinic.  If you do not have a primary care provider,  please call [phone] and they will assist you.        Care EveryWhere ID     This is your Care EveryWhere ID. This could be used by other organizations to access your [place] medical records  [id number]         Blood Pressure from Last 3 Encounters:   12/18/17 142/90   11/17/17 112/78   10/31/17 130/80    Weight from Last 3 Encounters:   12/18/17 82.2 kg (181 lb 3.2 oz)   11/17/17 80.6 kg (177 lb 9.6 oz)   10/31/17 81.6 kg (180 lb)              We Performed the Following     HST-Home Sleep Apnea Test        Primary Care Provider Office Phone # Fax #    Mauricio Chowdary [phone]       [facility] [address]  [facility] [address]        Equal Access to Services     MISTI GO : Hadii aad ku hadasho Soomaali, waaxda luqadaha, qaybta kaalmada adefernandoyasami, nica garces . So [facility] [phone].    ATENCIÓN: Si habla español, tiene a molina disposición servicios gratuitos de asistencia lingüística. Keerthi al [phone].    We comply with applicable federal civil rights laws and [place] laws. We do not discriminate on the basis of race, color, national origin, age, disability, sex, sexual orientation, or gender identity.            Thank you!     Thank you for choosing [place] [facility]  for your care. Our goal is always to provide you with excellent care. Hearing back from our patients is one way we can continue to improve our services. Please take a few minutes to complete the written survey that you may receive in the mail after your visit with us. Thank you!             Your Updated Medication List - Protect others around you: Learn how to safely use, store and throw away your medicines at www.disposemymeds.org.          This list is accurate as of: 12/21/17  3:51 PM.  Always use your most recent med list.                   Brand Name Dispense Instructions for use Diagnosis    COLCHICINE PO      Take 0.6 mg by mouth daily        MULTIVITAMIN ADULTS PO       Take by mouth daily        PRAVASTATIN SODIUM PO      Take 40 mg by mouth daily

## 2017-12-22 ENCOUNTER — OFFICE VISIT (OUTPATIENT)
Dept: SLEEP MEDICINE | Facility: CLINIC | Age: 57
End: 2017-12-22
Payer: COMMERCIAL

## 2017-12-22 DIAGNOSIS — G47.33 OSA (OBSTRUCTIVE SLEEP APNEA): Primary | ICD-10-CM

## 2017-12-22 PROCEDURE — 99207 ZZC DROP WITH A PROCEDURE: CPT | Mod: 25

## 2017-12-22 PROCEDURE — G0399 HOME SLEEP TEST/TYPE 3 PORTA: HCPCS | Performed by: INTERNAL MEDICINE

## 2017-12-22 NOTE — PROGRESS NOTES
HST failed due to oximeter malfunction. Patient to repeat test tonight, 12/22 and return device on Tuesday, 12/26. Charges have been removed.    Shanon VenturaMathur  Sleep Clinic - Specialist

## 2017-12-22 NOTE — PROGRESS NOTES
REPEAT  Patient instructed on HST use. Patient demonstrated and verbalized knowledge of use. Device programmed to start at 11pm. Device will be returned tomorrow before noon.    Shanon Mathur  Sleep Clinic - Specialist

## 2017-12-22 NOTE — MR AVS SNAPSHOT
After Visit Summary   12/22/2017    Michael S Rosler    MRN: [id number]           Patient Information     Date Of Birth          1960        Visit Information        Provider Department      12/22/2017 9:30 AM BED 7  SLEEP [facility]        Today's Diagnoses     RAFAEL (obstructive sleep apnea)    -  1       Follow-ups after your visit        Your next 10 appointments already scheduled     Dec 22, 2017  9:30 AM CST   HST  with BED 7  SLEEP   [facility] ([facility])    [address] [address] [phone]              Who to contact     If you have questions or need follow up information about today's clinic visit or your schedule please contact [facility] directly at [phone].  Normal or non-critical lab and imaging results will be communicated to you by Nuage Corporationhart, letter or phone within 4 business days after the clinic has received the results. If you do not hear from us within 7 days, please contact the clinic through Nuage Corporationhart or phone. If you have a critical or abnormal lab result, we will notify you by phone as soon as possible.  Submit refill requests through Vidder or call your pharmacy and they will forward the refill request to us. Please allow 3 business days for your refill to be completed.          Additional Information About Your Visit        MyChart Information     Vidder gives you secure access to your electronic health record. If you see a primary care provider, you can also send messages to your care team and make appointments. If you have questions, please call your primary care clinic.  If you do not have a primary care provider, please call [phone] and they will assist you.        Care EveryWhere ID     This is your Care EveryWhere ID. This could be used by other organizations to access your [place] medical records  [id number]         Blood Pressure  from Last 3 Encounters:   12/18/17 142/90   11/17/17 112/78   10/31/17 130/80    Weight from Last 3 Encounters:   12/18/17 82.2 kg (181 lb 3.2 oz)   11/17/17 80.6 kg (177 lb 9.6 oz)   10/31/17 81.6 kg (180 lb)              Today, you had the following     No orders found for display       Primary Care Provider Office Phone # Fax #    Mauricio Chowdary [phone]       [facility] [address]  [facility] [address]        Equal Access to Services     [facility]: Hadii phil arce hadronda Sodonte, waaxda jonathanadaha, qaybtru kaalmada dorian, nica garces . So [facility] [phone].    ATENCIÓN: Si habla español, tiene a molina disposición servicios gratuitos de asistencia lingüística. [facility] [phone].    We comply with applicable federal civil rights laws and [place] laws. We do not discriminate on the basis of race, color, national origin, age, disability, sex, sexual orientation, or gender identity.            Thank you!     Thank you for choosing [place] [facility]  for your care. Our goal is always to provide you with excellent care. Hearing back from our patients is one way we can continue to improve our services. Please take a few minutes to complete the written survey that you may receive in the mail after your visit with us. Thank you!             Your Updated Medication List - Protect others around you: Learn how to safely use, store and throw away your medicines at www.disposemymeds.org.          This list is accurate as of: 12/22/17  9:16 AM.  Always use your most recent med list.                   Brand Name Dispense Instructions for use Diagnosis    COLCHICINE PO      Take 0.6 mg by mouth daily        MULTIVITAMIN ADULTS PO      Take by mouth daily        PRAVASTATIN SODIUM PO      Take 40 mg by mouth daily

## 2017-12-22 NOTE — Clinical Note
Saige Calderon,   Can you fax a copy of my note with the interp and study report to Dr. PAULA Carney's office, and then mail to original to the patient?   Thanks,   Jean

## 2017-12-26 NOTE — PROCEDURES
"HOME SLEEP STUDY INTERPRETATION    Patient: Michael S Rosler  MRN: [id number]  YOB: 1960  Study Date: 12/22/2017  Referring Provider: Mauricio Chowdray;   Ordering Provider: Jean Peoples MD     Indications for Home Study: Michael S Rosler is a 57 year old male with a history of mild RAFAEL treated with dental appliance who underwent a home sleep apnea test with dental appliance.    Estimated body mass index is 25.63 kg/(m^2) as calculated from the following:    Height as of 12/18/17: 1.791 m (5' 10.5\").    Weight as of 12/18/17: 82.2 kg (181 lb 3.2 oz).  Total score - Skipwith: 3 (11/17/2017  3:01 PM)  STOP-BANG: -/8    Data: A full night home sleep study was performed recording the standard physiologic parameters including body position, movement, sound, nasal pressure, thermal oral airflow, chest and abdominal movements with respiratory inductance plethysmography, and oxygen saturation by pulse oximetry. Pulse rate was estimated by oximetry recording. This study was considered adequate based on > 4 hours of quality oximetry and respiratory recording. As specified by the AASM Manual for the Scoring of Sleep and Associated events, version 2.3, Rule VIII.D 1B, 4% oxygen desaturation scoring for hypopneas is used as a standard of care on all home sleep apnea testing.    Analysis Time:  408 minutes    Respiration:   Sleep Associated Hypoxemia: sustained hypoxemia was not present. Baseline oxygen saturation was 91.8%.  Time with saturation less than or equal to 88% was 0.8 minutes. The lowest oxygen saturation was 88%.   Snoring: Snoring was present; mild to moderate.  Respiratory events: The home study revealed a presence of 10 obstructive apneas and 4 mixed and 15 central apneas. There were 30 hypopneas resulting in a combined apnea/hypopnea index [AHI] of 8.7 events per hour.  AHI was 14.4 per hour supine, - per hour prone, 5.2 per hour on left side, and 2.2 per hour on right side.   Pattern: Excluding " events noted above, respiratory rate and pattern was Normal.    Position: Percent of time spent: supine - 48.5%, prone - 0%, on left - 16.8%, on right - 33.8%.    Heart Rate: By pulse oximetry normal rate was noted.     Assessment:   Mild obstructive sleep apnea with dental appliance. Respiratory events predominantly occurred in supine sleep.   Sleep associated hypoxemia was not present.    Recommendations:  Consider adjunctive  positional therapy to dental appliance to avoid supine sleep.   Further advancement of dental appliance or switch to CPAP are other options to consider depending on clinical situation.   Suggest optimizing sleep hygiene and avoiding sleep deprivation.  Weight management.    Diagnosis Code(s): Obstructive Sleep Apnea G47.33    Ben Valdez MD, MD, December 26, 2017   Diplomate, American Board of Psychiatry and Neurology, Sleep Medicine

## 2017-12-28 NOTE — PROGRESS NOTES
Called to review results.  Improved but not resolved compared to last study.  Looks like still has clusters of events (suspect supine REM?).  Supine AHI 19->14 and non-supine likely improved as well.    Discussed further advancement vs combo or change in therapy.  Patient requesting copy of Home Sleep Apnea Testing and interp mailed to him and faxed to Dr. Carney's office.     Jean Peoples MD, Sleep Physician  3:04 PM, 12/28/2017

## 2019-05-21 ENCOUNTER — TELEPHONE (OUTPATIENT)
Dept: SLEEP MEDICINE | Facility: CLINIC | Age: 59
End: 2019-05-21

## 2019-05-21 NOTE — TELEPHONE ENCOUNTER
SPOKE WITH PT ON THE PHONE. HE HAD QUESTIONS ABOUT CPAPS. TOLD HIM THE DIFFERENT BRANDS [facility] CARRIES, EXPLAINED MASKS, 30 DAY MASK EXCHANGE AND HOW THE SET UP PROCESS GOES.   PT IS GOING TO CONTACT [facility] TO MEET WITH SLEEP PROVIDER AND POSSIBLY GET A CURRENT RX FOR CPAP THERAPY.

## 2019-06-10 ENCOUNTER — TELEPHONE (OUTPATIENT)
Dept: SLEEP MEDICINE | Facility: CLINIC | Age: 59
End: 2019-06-10

## 2019-06-10 NOTE — TELEPHONE ENCOUNTER
Left message for patient to reschedule his follow-up appointment with Sanchez to discuss c-pap therapy. Patient was scheduled on 07/05/19. We have times on hold for these patients on 07/11/19 and 07/12/19. Please reschedule this patient.    Fiorella Marrero

## 2019-07-12 ENCOUNTER — OFFICE VISIT (OUTPATIENT)
Dept: SLEEP MEDICINE | Facility: CLINIC | Age: 59
End: 2019-07-12
Payer: COMMERCIAL

## 2019-07-12 VITALS
WEIGHT: 171 LBS | OXYGEN SATURATION: 96 % | SYSTOLIC BLOOD PRESSURE: 123 MMHG | RESPIRATION RATE: 16 BRPM | HEIGHT: 71 IN | BODY MASS INDEX: 23.94 KG/M2 | HEART RATE: 80 BPM | DIASTOLIC BLOOD PRESSURE: 80 MMHG

## 2019-07-12 DIAGNOSIS — G47.33 OSA (OBSTRUCTIVE SLEEP APNEA): ICD-10-CM

## 2019-07-12 PROCEDURE — 99214 OFFICE O/P EST MOD 30 MIN: CPT | Performed by: PHYSICIAN ASSISTANT

## 2019-07-12 ASSESSMENT — MIFFLIN-ST. JEOR: SCORE: 1604.84

## 2019-07-12 NOTE — PATIENT INSTRUCTIONS
Try the SnPinoyTravel elaine to track your snoring with and without the Slumberbump.    Your BMI is Body mass index is 24.19 kg/m .  Weight management is a personal decision.  If you are interested in exploring weight loss strategies, the following discussion covers the approaches that may be successful. Body mass index (BMI) is one way to tell whether you are at a healthy weight, overweight, or obese. It measures your weight in relation to your height.  A BMI of 18.5 to 24.9 is in the healthy range. A person with a BMI of 25 to 29.9 is considered overweight, and someone with a BMI of 30 or greater is considered obese. More than two-thirds of American adults are considered overweight or obese.  Being overweight or obese increases the risk for further weight gain. Excess weight may lead to heart disease and diabetes.  Creating and following plans for healthy eating and physical activity may help you improve your health.  Weight control is part of healthy lifestyle and includes exercise, emotional health, and healthy eating habits. Careful eating habits lifelong are the mainstay of weight control. Though there are significant health benefits from weight loss, long-term weight loss with diet alone may be very difficult to achieve- studies show long-term success with dietary management in less than 10% of people. Attaining a healthy weight may be especially difficult to achieve in those with severe obesity. In some cases, medications, devices and surgical management might be considered.  What can you do?  If you are overweight or obese and are interested in methods for weight loss, you should discuss this with your provider.     Consider reducing daily calorie intake by 500 calories.     Keep a food journal.     Avoiding skipping meals, consider cutting portions instead.    Diet combined with exercise helps maintain muscle while optimizing fat loss. Strength training is particularly important for building and maintaining muscle  mass. Exercise helps reduce stress, increase energy, and improves fitness. Increasing exercise without diet control, however, may not burn enough calories to loose weight.       Start walking three days a week 10-20 minutes at a time    Work towards walking thirty minutes five days a week     Eventually, increase the speed of your walking for 1-2 minutes at time    In addition, we recommend that you review healthy lifestyles and methods for weight loss available through the National Institutes of Health patient information sites:  http://win.niddk.nih.gov/publications/index.htm    And look into health and wellness programs that may be available through your health insurance provider, employer, local community center, or [employer].

## 2019-07-12 NOTE — PROGRESS NOTES
Sleep Study Follow-Up Visit:    Date on this visit: 7/12/2019    Michael S Rosler comes in today for further treatment of RAFAEL. He was initially seen at the [facility] for nocturnal tachycardia symptoms and wondering if Obstructive Sleep Apnea is effectively treated with Mandibular Advancement Device.     He has a history of mild RAFAEL.     4/1/2010 - [facility] Polysomnography - AHI 8.7/hr; RDI 34.1/hr; SpO2 constantin 88%.  Didn't tolerate CPAP.  Using Mandibular Advancement Device made from Dr. PAULA Carney.    He has had subsequent home sleep tests with the dental appliance. The most recent was in 12/2017 and it showed an AHI of 8.7/hr. His supine AHI was 14/hr and lateral AHI was 2-5/hr.  He feels he sleeps ok, but he is not rested when he wakes. He has also been waking with headaches all over his head. His friend was having a similar situation with a dental appliance the headaches went away with switching to CPAP. About 25% of the time, he sleeps well and wakes feeling rested. He is observed to snore with the dental appliance. He is very comfortable with the dental appliance.  His sleep schedule is very regular. He has no work or family stress. He eats well and exercises regularly.    Past medical/surgical history, family history, social history, medications and allergies were reviewed.      Problem List:  Patient Active Problem List    Diagnosis Date Noted     RAFAEL (obstructive sleep apnea) 10/13/2017     Priority: Medium     4/1/2010 - [facility] Polysomnography - AHI 8.7/hr; RDI 34.1/hr; SpO2 constantin 88%.  Didn't tolerate CPAP.  Using Mandibular Advancement Device made from Dr. PAULA Carney.       Hyperlipidemia      Priority: Medium     Other anxiety states      Priority: Medium        Impression/Plan:    (G47.33) RAFAEL (obstructive sleep apnea)  Comment: 2 home studies with the dental appliance show mild to moderate residual apnea when supine but normal or near normal levels of apnea when lateral. He has been waking  feeling unrefreshed and with headaches on about 75% of nights. He is observed to snore with the dental appliance, but is unsure how that varies with position.  Plan: He will try positional restriction (was shown Slumberbump) with the dental appliance. He will use the SnoreLab elaine to record snoring with these devices.  If he is feeling better, we can perform the home study to verify efficacy. If not, he may try CPAP. He will contact me over PWAt in a couple of months.      Clinical follow up will depend on how he responds to the above plan.     Twenty-five minutes spent with patient, all of which were spent face-to-face counseling, consulting, coordinating plan of care.      Bennett Goltz, PA-C    CC: No ref. provider found

## 2019-09-05 ENCOUNTER — MYC MEDICAL ADVICE (OUTPATIENT)
Dept: SLEEP MEDICINE | Facility: CLINIC | Age: 59
End: 2019-09-05

## 2019-09-05 DIAGNOSIS — G47.33 OSA (OBSTRUCTIVE SLEEP APNEA): Primary | ICD-10-CM

## 2019-09-23 ENCOUNTER — DOCUMENTATION ONLY (OUTPATIENT)
Dept: SLEEP MEDICINE | Facility: CLINIC | Age: 59
End: 2019-09-23

## 2019-09-23 DIAGNOSIS — G47.33 OSA (OBSTRUCTIVE SLEEP APNEA): ICD-10-CM

## 2019-09-23 NOTE — PROGRESS NOTES
Pt has been trying to get setup with a cpap machine for about two weeks. Pt has been calling different [facility] numbers and has not been setup. Pt left a message with the [place] sleep clinic looking for other options to get setup. After looking into pt account it seems [facility] is trying to get ahold of the interpretation done for a diagnotic sleep study in 2010 from [facility]. I called [facility] med  rec dept and spoke with Torres and requested a copy of the interp. Called pt to update on the status and speak about the situation. gene with my direct number [phone]

## 2019-09-23 NOTE — PROGRESS NOTES
Pt returned my call and we spoke about the situation and [facility] needing the diagnostic PSG interp from [facility] in 2010. Pt let me know he has a copy in his records and can fax it to me directly at [phone]. Pt scheduled an appointment on Friday September 27, 2019 at 11am. Location info verified with pt.

## 2019-09-27 ENCOUNTER — DOCUMENTATION ONLY (OUTPATIENT)
Dept: SLEEP MEDICINE | Facility: CLINIC | Age: 59
End: 2019-09-27

## 2019-09-27 DIAGNOSIS — G47.33 OSA (OBSTRUCTIVE SLEEP APNEA): ICD-10-CM

## 2019-09-27 NOTE — PROGRESS NOTES
Patient was offered choice of vendor and chose [facility].  Patient Michael S Rosler was set up at [facility] on September 27, 2019. Patient received a Resmed AirSense 10 Auto. Pressures were set at 5-15 cm H2O.   Patient s ramp is 5 cm H2O for Auto and FLEX/EPR is EPR, 2.  Patient received a Don Respironics Mask name: NUANCE PRO  Pillow mask size Medium, heated tubing and heated humidifier.  Patient is enrolled in the STM Program and does not need to meet compliance. Patient has a follow up on PT WILL CALL TO SCHEDULE    Manolo Shukla

## 2019-09-29 ENCOUNTER — HEALTH MAINTENANCE LETTER (OUTPATIENT)
Age: 59
End: 2019-09-29

## 2019-09-30 ENCOUNTER — DOCUMENTATION ONLY (OUTPATIENT)
Dept: SLEEP MEDICINE | Facility: CLINIC | Age: 59
End: 2019-09-30

## 2019-09-30 DIAGNOSIS — G47.33 OSA (OBSTRUCTIVE SLEEP APNEA): ICD-10-CM

## 2019-09-30 NOTE — PROGRESS NOTES
3 DAY STM VISIT    Diagnostic AHI: 8.7   PSG    Patient contacted for 3 day STM visit  Subjective measures:  Pt reports things are going well.  No questions so far.      Replacement device: No     Device type: Auto-CPAP  PAP settings from order::  CPAP min 5 cm  H20       CPAP max 15 cm  H20       Mask type:    Nasal Pillows     Device settings from machine      Min CPAP 5.0            Max CPAP 15.0         Assessment: Nightly usage, most nights over four hours   Action plan: Pt to have f/u 14 day [facility] visit.  Patient has a follow up visit scheduled:   not required by insurance.    Total time spent on remote patient monitoring data analysis and patient contact today:   14 minutes

## 2019-10-04 ENCOUNTER — DOCUMENTATION ONLY (OUTPATIENT)
Dept: SLEEP MEDICINE | Facility: CLINIC | Age: 59
End: 2019-10-04

## 2019-10-04 DIAGNOSIS — G47.33 OSA (OBSTRUCTIVE SLEEP APNEA): ICD-10-CM

## 2019-10-04 NOTE — PROGRESS NOTES
STM     Diagnostic AHI: 8.7    PSG    Subjective measures:   Pt is calling regarding ringing in the ear the developed this morning after pap usage.     Assessment: Pt meeting objective benchmarks.  Patient meeting subjective benchmarks with the exception of the ringing of the ears.     Action plan: pt to continue to use the device and may take a night off to see if it does stop or may reach out to primary care       Device type: Auto-CPAP    PAP settings: CPAP min 5.0 cm  H20       CPAP max 15.0 cm  H20        95th% pressure 8.9 cm  H20     Mask type:  Nasal Pillows    Objective measures: 14 day rolling measures      Compliance  100 %      Leak  13.37 lpm  last  upload      AHI 3.16   last  upload      Average number of minutes 357      Objective measure goal  Compliance   Goal >70%  Leak   Goal < 24 lpm  AHI  Goal < 5  Usage  Goal >240      Total time spent on remote patient monitoring data analysis and patient contact today:   15 minutes

## 2019-10-07 ENCOUNTER — MYC MEDICAL ADVICE (OUTPATIENT)
Dept: SLEEP MEDICINE | Facility: CLINIC | Age: 59
End: 2019-10-07

## 2019-10-07 DIAGNOSIS — G47.33 OSA (OBSTRUCTIVE SLEEP APNEA): ICD-10-CM

## 2019-10-07 DIAGNOSIS — G47.33 OSA (OBSTRUCTIVE SLEEP APNEA): Primary | ICD-10-CM

## 2019-10-08 NOTE — TELEPHONE ENCOUNTER
I called Don to see if he was by his machine so I could walk him through the pressure change. He was not by his machine. He will bring his CPAP by the clinic tomorrow and I will make the pressure change. I will also look in his ear to see if he has any fluid.  Bennett Goltz, PA-C

## 2019-10-08 NOTE — TELEPHONE ENCOUNTER
I changed pressures remotely to 5-9 cm H2O and left patient a voicemail to inform him and to call [facility] if he has any questions.

## 2019-10-11 ENCOUNTER — DOCUMENTATION ONLY (OUTPATIENT)
Dept: SLEEP MEDICINE | Facility: CLINIC | Age: 59
End: 2019-10-11

## 2019-10-11 DIAGNOSIS — G47.33 OSA (OBSTRUCTIVE SLEEP APNEA): ICD-10-CM

## 2019-10-11 NOTE — PROGRESS NOTES
Pt came into the [facility] to try out nasal masks. Pt current mask is the respironics nuance pro. Pt likes the pillow mask however; he is experiencing ear pressure and congestion due to cpap. Pt feels a less direct pressure apparatus will alleviate his ear congestion. Pt tried on the resmed airfit n20 med  Respironics wisp   Resmed airfit f30   Respirionics dreamwear ffm   Pt liked the resmed airfit n20 med the best and this mask was exchange for the 30 day exchange. Pt also liked the resmed airfit f30 and would like to try it as well. Pt was given a demo of the f30 to try to see which mask alleviates the ear pressure congestion if any do.

## 2019-10-14 ENCOUNTER — DOCUMENTATION ONLY (OUTPATIENT)
Dept: SLEEP MEDICINE | Facility: CLINIC | Age: 59
End: 2019-10-14

## 2019-10-14 DIAGNOSIS — G47.33 OSA (OBSTRUCTIVE SLEEP APNEA): ICD-10-CM

## 2019-10-14 NOTE — PROGRESS NOTES
14  DAY STM VISIT    Diagnostic AHI: 8.7    PSG    Subjective measures:   Pt feels the machine is not working out very well for him.  He is struggling with ringing in the ear and ear pressure.  He has a follow up appt with ENT this week.     Assessment: Pt not meeting objective benchmarks for compliance  Patient failing following subjective benchmarks: ear pressure     Action plan: pt to have 30 day STM visit.      Device type: Auto-CPAP    PAP settings: CPAP min 5.0 cm  H20       CPAP max 9.0 cm  H20      95th% pressure 8.6 cm  H20     Mask type:  Nasal Mask    Objective measures: 14 day rolling measures      Compliance  57 %      Leak  13.75 lpm  last  upload      AHI 4.22   last  upload      Average number of minutes 199      Objective measure goal  Compliance   Goal >70%  Leak   Goal < 24 lpm  AHI  Goal < 5  Usage  Goal >240      Total time spent on remote patient monitoring data analysis and patient contact today:   16  minutes

## 2019-10-29 ENCOUNTER — DOCUMENTATION ONLY (OUTPATIENT)
Dept: SLEEP MEDICINE | Facility: CLINIC | Age: 59
End: 2019-10-29

## 2019-10-29 DIAGNOSIS — G47.33 OSA (OBSTRUCTIVE SLEEP APNEA): ICD-10-CM

## 2019-10-29 NOTE — PROGRESS NOTES
30 DAY STM VISIT    Diagnostic AHI: 8.7  PSG    Subjective measures:   Patient still having ringing in his ear and unable to use CPAP. Patient is seeing an ENT to help resolve this. Patient will call if he is able to use CPAP again.     Assessment: Pt not meeting objective benchmarks for compliance  Patient failing following subjective benchmarks: Ringing in his ear.   Action plan: pt to have 6 month [facility] visit  Patient has not scheduled a follow up visit.  Device type: Auto-CPAP  PAP settings: CPAP min 5.0 cm  H20     CPAP max 9.0 cm  H20      Mask type:  Nasal Mask  Objective measures: 14 day rolling measures      Compliance  42 %      Average number of minutes 156      Objective measure goal  Compliance   Goal >70%  Leak   Goal < 24 lpm  AHI  Goal < 5  Usage  Goal >240      Total time spent on remote patient monitoring data analysis and patient contact today:   16 minutes      Total contact/remote patient monitoring for last 30 days:   61 min

## 2019-11-04 ENCOUNTER — TRANSFERRED RECORDS (OUTPATIENT)
Dept: HEALTH INFORMATION MANAGEMENT | Facility: CLINIC | Age: 59
End: 2019-11-04

## 2019-11-05 ENCOUNTER — INFUSION THERAPY VISIT (OUTPATIENT)
Dept: INFUSION THERAPY | Facility: CLINIC | Age: 59
End: 2019-11-05
Attending: OTOLARYNGOLOGY
Payer: COMMERCIAL

## 2019-11-05 VITALS
SYSTOLIC BLOOD PRESSURE: 134 MMHG | DIASTOLIC BLOOD PRESSURE: 83 MMHG | TEMPERATURE: 97.5 F | HEART RATE: 71 BPM | RESPIRATION RATE: 16 BRPM

## 2019-11-05 DIAGNOSIS — H90.41 SENSORINEURAL HEARING LOSS, UNILATERAL, RIGHT EAR, WITH UNRESTRICTED HEARING ON THE CONTRALATERAL SIDE: Primary | ICD-10-CM

## 2019-11-05 DIAGNOSIS — H90.41 SENSORINEURAL HEARING LOSS, UNILATERAL, RIGHT EAR, WITH UNRESTRICTED HEARING ON THE CONTRALATERAL SIDE: ICD-10-CM

## 2019-11-05 PROCEDURE — 25000128 H RX IP 250 OP 636: Performed by: OTOLARYNGOLOGY

## 2019-11-05 PROCEDURE — 96365 THER/PROPH/DIAG IV INF INIT: CPT

## 2019-11-05 PROCEDURE — 25800030 ZZH RX IP 258 OP 636: Performed by: OTOLARYNGOLOGY

## 2019-11-05 RX ORDER — HEPARIN SODIUM,PORCINE 10 UNIT/ML
5 VIAL (ML) INTRAVENOUS
Status: CANCELLED | OUTPATIENT
Start: 2019-11-05

## 2019-11-05 RX ORDER — SUMATRIPTAN 100 MG/1
TABLET, FILM COATED ORAL
COMMUNITY
Start: 2019-08-12

## 2019-11-05 RX ORDER — HEPARIN SODIUM,PORCINE 10 UNIT/ML
5 VIAL (ML) INTRAVENOUS
Status: CANCELLED | OUTPATIENT
Start: 2019-11-06

## 2019-11-05 RX ORDER — HEPARIN SODIUM (PORCINE) LOCK FLUSH IV SOLN 100 UNIT/ML 100 UNIT/ML
5 SOLUTION INTRAVENOUS
Status: CANCELLED | OUTPATIENT
Start: 2019-11-06

## 2019-11-05 RX ORDER — HEPARIN SODIUM (PORCINE) LOCK FLUSH IV SOLN 100 UNIT/ML 100 UNIT/ML
5 SOLUTION INTRAVENOUS
Status: CANCELLED | OUTPATIENT
Start: 2019-11-05

## 2019-11-05 RX ADMIN — SODIUM CHLORIDE 1000 MG: 9 INJECTION, SOLUTION INTRAVENOUS at 14:43

## 2019-11-05 ASSESSMENT — PAIN SCALES - GENERAL: PAINLEVEL: NO PAIN (0)

## 2019-11-05 NOTE — PROGRESS NOTES
Infusion Nursing Note:  Michael S Rosler presents today for solumedrol.    Patient seen by provider today: No   present during visit today: Not Applicable.    Note: N/A.    Intravenous Access:  Peripheral IV placed.    Treatment Conditions:  Not Applicable.      Post Infusion Assessment:  Patient tolerated infusion without incident.  Blood return noted pre and post infusion.  Site patent and intact, free from redness, edema or discomfort.  No evidence of extravasations.  Access discontinued per protocol.       Discharge Plan:   Discharge instructions reviewed with: Patient.  Patient and/or family verbalized understanding of discharge instructions and all questions answered.  Patient discharged in stable condition accompanied by: self.  Departure Mode: Ambulatory.    Katy Mustafa RN

## 2019-11-06 ENCOUNTER — INFUSION THERAPY VISIT (OUTPATIENT)
Dept: INFUSION THERAPY | Facility: CLINIC | Age: 59
End: 2019-11-06
Attending: OTOLARYNGOLOGY
Payer: COMMERCIAL

## 2019-11-06 VITALS
HEART RATE: 111 BPM | DIASTOLIC BLOOD PRESSURE: 92 MMHG | RESPIRATION RATE: 18 BRPM | SYSTOLIC BLOOD PRESSURE: 144 MMHG | TEMPERATURE: 97.8 F

## 2019-11-06 DIAGNOSIS — H90.41 SENSORINEURAL HEARING LOSS, UNILATERAL, RIGHT EAR, WITH UNRESTRICTED HEARING ON THE CONTRALATERAL SIDE: Primary | ICD-10-CM

## 2019-11-06 PROCEDURE — 25800030 ZZH RX IP 258 OP 636: Performed by: OTOLARYNGOLOGY

## 2019-11-06 PROCEDURE — 25000128 H RX IP 250 OP 636: Performed by: OTOLARYNGOLOGY

## 2019-11-06 PROCEDURE — 96365 THER/PROPH/DIAG IV INF INIT: CPT

## 2019-11-06 RX ORDER — HEPARIN SODIUM,PORCINE 10 UNIT/ML
5 VIAL (ML) INTRAVENOUS
Status: CANCELLED | OUTPATIENT
Start: 2019-11-07

## 2019-11-06 RX ORDER — HEPARIN SODIUM (PORCINE) LOCK FLUSH IV SOLN 100 UNIT/ML 100 UNIT/ML
5 SOLUTION INTRAVENOUS
Status: CANCELLED | OUTPATIENT
Start: 2019-11-07

## 2019-11-06 RX ADMIN — SODIUM CHLORIDE 1000 MG: 9 INJECTION, SOLUTION INTRAVENOUS at 14:14

## 2019-11-06 ASSESSMENT — PAIN SCALES - GENERAL: PAINLEVEL: NO PAIN (0)

## 2019-11-06 NOTE — PROGRESS NOTES
Infusion Nursing Note:  Michael S Rosler presents today for solumedrol.    Patient seen by provider today: No   present during visit today: Not Applicable.    Note: N/A.    Intravenous Access:  Peripheral IV placed.    Treatment Conditions:  Not Applicable.      Post Infusion Assessment:  Patient tolerated infusion without incident.  Site patent and intact, free from redness, edema or discomfort.  No evidence of extravasations.  Access discontinued per protocol.       Discharge Plan:   AVS to patient via MYCHART.  Patient will return 11/7 for next appointment.   Patient discharged in stable condition accompanied by: self.  Departure Mode: Ambulatory.    Ruddy Raymundo RN

## 2019-11-07 ENCOUNTER — INFUSION THERAPY VISIT (OUTPATIENT)
Dept: INFUSION THERAPY | Facility: CLINIC | Age: 59
End: 2019-11-07
Attending: OTOLARYNGOLOGY
Payer: COMMERCIAL

## 2019-11-07 VITALS
SYSTOLIC BLOOD PRESSURE: 129 MMHG | TEMPERATURE: 97.9 F | HEART RATE: 76 BPM | RESPIRATION RATE: 18 BRPM | DIASTOLIC BLOOD PRESSURE: 80 MMHG

## 2019-11-07 DIAGNOSIS — H90.41 SENSORINEURAL HEARING LOSS, UNILATERAL, RIGHT EAR, WITH UNRESTRICTED HEARING ON THE CONTRALATERAL SIDE: Primary | ICD-10-CM

## 2019-11-07 PROCEDURE — 25800030 ZZH RX IP 258 OP 636: Performed by: OTOLARYNGOLOGY

## 2019-11-07 PROCEDURE — 96365 THER/PROPH/DIAG IV INF INIT: CPT

## 2019-11-07 PROCEDURE — 25000128 H RX IP 250 OP 636: Performed by: OTOLARYNGOLOGY

## 2019-11-07 RX ORDER — HEPARIN SODIUM,PORCINE 10 UNIT/ML
5 VIAL (ML) INTRAVENOUS
Status: CANCELLED | OUTPATIENT
Start: 2019-11-08

## 2019-11-07 RX ORDER — HEPARIN SODIUM (PORCINE) LOCK FLUSH IV SOLN 100 UNIT/ML 100 UNIT/ML
5 SOLUTION INTRAVENOUS
Status: CANCELLED | OUTPATIENT
Start: 2019-11-08

## 2019-11-07 RX ADMIN — SODIUM CHLORIDE 1000 MG: 9 INJECTION, SOLUTION INTRAVENOUS at 13:35

## 2019-11-07 NOTE — PROGRESS NOTES
Infusion Nursing Note:  Don GEE Rosler presents today for solu-mederol.    Patient seen by provider today: No   present during visit today: Not Applicable.    Note: N/A.    Intravenous Access:  Peripheral IV placed.    Treatment Conditions:  Not Applicable.      Post Infusion Assessment:  Patient tolerated infusion without incident.  Blood return noted pre and post infusion.  Site patent and intact, free from redness, edema or discomfort.  No evidence of extravasations.  Access discontinued per protocol.       Discharge Plan:   Discharge instructions reviewed with: Patient.  Patient and/or family verbalized understanding of discharge instructions and all questions answered.  Patient discharged in stable condition accompanied by: self.  Departure Mode: Ambulatory.    Esther Salazar RN

## 2020-03-26 ENCOUNTER — DOCUMENTATION ONLY (OUTPATIENT)
Dept: SLEEP MEDICINE | Facility: CLINIC | Age: 60
End: 2020-03-26

## 2020-03-26 NOTE — PROGRESS NOTES
6 month [facility] Recheck Visit     Diagnostic AHI: 8.7 PSG    Data only recheck     Assessment: Pt not meeting objective benchmarks for compliance. Pt has not used device since 10/19. He had ongoing concerns about ringing in his R ear after starting PAP therapy. He and his sleep provider tried several changes to no avail. He planned to consult with an ENT provider for further advice.   Pt met with an ENT provider shortly after this who diagnosed him with sensorineural hearing loss in his R ear. The ENT provider told him that it was not connected with the PAP therapy but rather that the condition tends to be most noticeable upon waking.    Pt has not resumed PAP therapy at this point and does not currently have any follow up appointments scheduled with his sleep provider.  Action plan:  pt to follow up per provider request       Device type: Auto-CPAP  PAP settings: CPAP min 5.0 cm  H20     CPAP max 9.0 cm  H20        Total time spent on accessing, reviewing and interpreting remote patient PAP therapy data:   15 minutes      Total time spent with direct patient communication :   0 minutes

## 2020-04-15 ENCOUNTER — TELEPHONE (OUTPATIENT)
Dept: CARDIOLOGY | Facility: CLINIC | Age: 60
End: 2020-04-15

## 2020-04-15 PROBLEM — I31.9 PERICARDITIS: Status: ACTIVE | Noted: 2020-04-15

## 2020-04-15 NOTE — TELEPHONE ENCOUNTER
"Call from patient with update that he feels a lighter version of pericarditis may have returned approximately 4 to 5 weeks ago. Complaint of mild left side chest pain similar to when he had pericarditis in the past but a \"very milder version\". Patient denies any other symptoms such as radiating chest pain shortness of breath or nausea.Patient reports he had complete relief of pain after colchicine in 2017 and notes no recurrence of pain until recently.    Last seen by Dr Bolivar 12/18/2017- Single episode of acute pericarditis on 09/28/2017 with complete symptomatic resolution, normal cardiac MRI, normal inflammatory markers. Plan to see back as needed.    Seen by Primary MD on 1/31/2020 with labs - note in Care Everywhere.  Labs 1/31/2020  TSH 1.52; CMP w/ K 4.3, Cr 1.01,  Alk phos 81, ALT 30, AST 21; CBC w/ WBC 5.8, Hbg 14.3, platelets 204; lipid panel w/ cholesterol 176, HDL 47, .    Patient reports not wanting to come in for visit with concerns over exposure to COVID-19. Offered patient video or phone visit, he declined asking  if Dr Bolivar can provide recommendations for him including possibly taking Ibuprofen and if so dosage. Update sent to Dr Bolivar for review.  Debora Weathers RN 04/15/2020 12:07 PM    --------------------------    Debora    He can take ibuprofen and see if it helps.  Beyond that, it is not possible to make recommendations without at least talking to the patient via a virtual visit, either with myself or with an FREDI.    Thank you  Dr. Bolivar     -----------------------    Called to patient with DR Bolivar response. Patient now agrees to VIDEO visit - set up same via appointments and messaged to scheduling to call with instructions on use.  Debora Weathers RN 04/15/20 2:22 PM      "

## 2020-05-18 ENCOUNTER — TELEPHONE (OUTPATIENT)
Dept: CARDIOLOGY | Facility: CLINIC | Age: 60
End: 2020-05-18

## 2020-05-18 NOTE — TELEPHONE ENCOUNTER
----- Message from Loida Phillip sent at 5/18/2020 11:04 AM CDT -----  Regarding: Pt CXL appt- MARTINA Newsome,    This pt was schedule with Osmel 5/20 10:45am virtual for symptoms- pt called today to CXL appt states he feels better and no longer have symptoms he is aware to call back if he has any additional issues.    Thank you,  Ciro

## 2021-01-14 ENCOUNTER — HEALTH MAINTENANCE LETTER (OUTPATIENT)
Age: 61
End: 2021-01-14

## 2021-06-26 PROCEDURE — 93005 ELECTROCARDIOGRAM TRACING: CPT

## 2021-06-26 PROCEDURE — 99284 EMERGENCY DEPT VISIT MOD MDM: CPT | Mod: 25

## 2021-06-26 PROCEDURE — 96374 THER/PROPH/DIAG INJ IV PUSH: CPT

## 2021-06-27 ENCOUNTER — HOSPITAL ENCOUNTER (EMERGENCY)
Facility: CLINIC | Age: 61
Discharge: HOME | End: 2021-06-27
Attending: EMERGENCY MEDICINE | Admitting: EMERGENCY MEDICINE
Payer: COMMERCIAL

## 2021-06-27 VITALS
BODY MASS INDEX: 24.64 KG/M2 | OXYGEN SATURATION: 97 % | SYSTOLIC BLOOD PRESSURE: 126 MMHG | RESPIRATION RATE: 16 BRPM | WEIGHT: 176 LBS | DIASTOLIC BLOOD PRESSURE: 81 MMHG | TEMPERATURE: 97.8 F | HEART RATE: 78 BPM | HEIGHT: 71 IN

## 2021-06-27 DIAGNOSIS — R42 VERTIGO: ICD-10-CM

## 2021-06-27 LAB
ALBUMIN SERPL-MCNC: 3.4 G/DL (ref 3.4–5)
ALP SERPL-CCNC: 82 U/L (ref 40–150)
ALT SERPL W P-5'-P-CCNC: 35 U/L (ref 0–70)
ANION GAP SERPL CALCULATED.3IONS-SCNC: 6 MMOL/L (ref 3–14)
AST SERPL W P-5'-P-CCNC: 24 U/L (ref 0–45)
BASOPHILS # BLD AUTO: 0.1 10E9/L (ref 0–0.2)
BASOPHILS NFR BLD AUTO: 0.9 %
BILIRUB SERPL-MCNC: 0.2 MG/DL (ref 0.2–1.3)
BUN SERPL-MCNC: 29 MG/DL (ref 7–30)
CALCIUM SERPL-MCNC: 8.9 MG/DL (ref 8.5–10.1)
CHLORIDE SERPL-SCNC: 110 MMOL/L (ref 94–109)
CO2 SERPL-SCNC: 24 MMOL/L (ref 20–32)
CREAT SERPL-MCNC: 1.06 MG/DL (ref 0.66–1.25)
DIFFERENTIAL METHOD BLD: NORMAL
EOSINOPHIL # BLD AUTO: 0.2 10E9/L (ref 0–0.7)
EOSINOPHIL NFR BLD AUTO: 3.5 %
ERYTHROCYTE [DISTWIDTH] IN BLOOD BY AUTOMATED COUNT: 12.1 % (ref 10–15)
GFR SERPL CREATININE-BSD FRML MDRD: 75 ML/MIN/{1.73_M2}
GLUCOSE SERPL-MCNC: 131 MG/DL (ref 70–99)
HCT VFR BLD AUTO: 40.2 % (ref 40–53)
HGB BLD-MCNC: 13.4 G/DL (ref 13.3–17.7)
IMM GRANULOCYTES # BLD: 0.1 10E9/L (ref 0–0.4)
IMM GRANULOCYTES NFR BLD: 1 %
INTERPRETATION ECG - MUSE: NORMAL
LYMPHOCYTES # BLD AUTO: 1.3 10E9/L (ref 0.8–5.3)
LYMPHOCYTES NFR BLD AUTO: 22.6 %
MCH RBC QN AUTO: 30 PG (ref 26.5–33)
MCHC RBC AUTO-ENTMCNC: 33.3 G/DL (ref 31.5–36.5)
MCV RBC AUTO: 90 FL (ref 78–100)
MONOCYTES # BLD AUTO: 0.8 10E9/L (ref 0–1.3)
MONOCYTES NFR BLD AUTO: 13.6 %
NEUTROPHILS # BLD AUTO: 3.4 10E9/L (ref 1.6–8.3)
NEUTROPHILS NFR BLD AUTO: 58.4 %
NRBC # BLD AUTO: 0 10*3/UL
NRBC BLD AUTO-RTO: 0 /100
PLATELET # BLD AUTO: 251 10E9/L (ref 150–450)
POTASSIUM SERPL-SCNC: 3.5 MMOL/L (ref 3.4–5.3)
PROT SERPL-MCNC: 6.8 G/DL (ref 6.8–8.8)
RBC # BLD AUTO: 4.46 10E12/L (ref 4.4–5.9)
SODIUM SERPL-SCNC: 140 MMOL/L (ref 133–144)
WBC # BLD AUTO: 5.8 10E9/L (ref 4–11)

## 2021-06-27 PROCEDURE — 80053 COMPREHEN METABOLIC PANEL: CPT | Performed by: EMERGENCY MEDICINE

## 2021-06-27 PROCEDURE — 85025 COMPLETE CBC W/AUTO DIFF WBC: CPT | Performed by: EMERGENCY MEDICINE

## 2021-06-27 PROCEDURE — 250N000011 HC RX IP 250 OP 636: Performed by: EMERGENCY MEDICINE

## 2021-06-27 PROCEDURE — 250N000013 HC RX MED GY IP 250 OP 250 PS 637: Performed by: EMERGENCY MEDICINE

## 2021-06-27 RX ORDER — MECLIZINE HYDROCHLORIDE 25 MG/1
50 TABLET ORAL ONCE
Status: COMPLETED | OUTPATIENT
Start: 2021-06-27 | End: 2021-06-27

## 2021-06-27 RX ORDER — MECLIZINE HYDROCHLORIDE 25 MG/1
25-50 TABLET ORAL 4 TIMES DAILY PRN
Qty: 20 TABLET | Refills: 0 | Status: SHIPPED | OUTPATIENT
Start: 2021-06-27

## 2021-06-27 RX ORDER — ONDANSETRON 2 MG/ML
4 INJECTION INTRAMUSCULAR; INTRAVENOUS ONCE
Status: COMPLETED | OUTPATIENT
Start: 2021-06-27 | End: 2021-06-27

## 2021-06-27 RX ORDER — ONDANSETRON 4 MG/1
4-8 TABLET, ORALLY DISINTEGRATING ORAL EVERY 8 HOURS PRN
Qty: 12 TABLET | Refills: 0 | Status: SHIPPED | OUTPATIENT
Start: 2021-06-27 | End: 2021-06-30

## 2021-06-27 RX ADMIN — MECLIZINE HYDROCHLORIDE 50 MG: 25 TABLET ORAL at 01:26

## 2021-06-27 RX ADMIN — ONDANSETRON 4 MG: 2 INJECTION INTRAMUSCULAR; INTRAVENOUS at 01:26

## 2021-06-27 ASSESSMENT — ENCOUNTER SYMPTOMS
WEAKNESS: 1
DIZZINESS: 1
PALPITATIONS: 0
ABDOMINAL PAIN: 0
HEADACHES: 0
NAUSEA: 1
VOMITING: 1

## 2021-06-27 ASSESSMENT — MIFFLIN-ST. JEOR: SCORE: 1625.46

## 2021-06-27 NOTE — ED PROVIDER NOTES
History   Chief Complaint:  Dizziness (Vertigo)     The history is provided by the patient.      Michael S Rosler is a 60 year old male with history of hyperlipidemia, migraines, sensorineural hearing loss in the right ear, and pericarditis who presents with dizziness and vertigo sensations. The patient explains that he felt normal all day and stuck to his daily routine. Around 10:30 PM, after his girlfriend left to go home, he was sitting alone and suddenly felt really hot, felt of, and noticed that the room felt as though it was spinning. He called his girlfriend and she was able to pick him up and bring him to the ED. He explains that he has been keeping his eyes shut since the episode started and has experienced difficulties balancing and nausea. He notes that he suffers from migraines, but he did not have any headaches today. He vomited prior to being evaluated in the ED, and explains that this was relieving for him and helped some of his symptoms. He feels overall weak, but does not have any one-sided weakness in his extremities. He denies chest pain, palpitations, abdominal pain, arm or leg weakness, or double vision.     To note: He explained that about a year and a half ago, he experienced sudden loss of hearing in his right ear.  Was evaluated by ENT.    Review of Systems   Eyes: Negative for visual disturbance.   Cardiovascular: Negative for chest pain and palpitations.   Gastrointestinal: Positive for nausea and vomiting. Negative for abdominal pain.   Neurological: Positive for dizziness and weakness. Negative for headaches.   All other systems reviewed and are negative.    Allergies:  No Known Allergies    Medications:  Pravastatin sodium PO  Imitrex    Past Medical History:    Hyperlipidemia  Insomnia  Keratosis seborrheica  Kidney stone  Migraine  Other anxiety states  Pericarditis  Sensorineural hearing loss, unilateral, right ear, with unrestricted hearing on the contralateral side  RAFAEL  Genital  "lesion    Past Surgical History:    Bilateral inguinal hernia repair  Vasectomy    Family History:    Mother: COPD  Sister: neurologic disorder, brain tumor    Social History:  PCP: Mauricio Chowdary  Presents with his girlfriend    Physical Exam     Patient Vitals for the past 24 hrs:   BP Temp Temp src Pulse Resp SpO2 Height Weight   06/27/21 0119 126/81 97.8  F (36.6  C) Oral 78 16 97 % 1.803 m (5' 11\") 79.8 kg (176 lb)       Physical Exam  Head:  The scalp, face, and head appear normal and symmetric  Eyes:  Sclera white; PERRL; EOMI w/o diplopia  ENT:    External ears and nares normal  Neck:  No meningismus or bruit  CV:  Rate as above with regular rhythm   Resp:  Breath sounds clear and equal bilaterally  GI:  Abdomen is soft, non-tender, non-distended  MS:  Moves all extremities  Neuro:  Speech is normal and fluent. No apparent deficit    Strength 5/5 x4.  Sensation intact x4.      Cranial nerves II-XII intact by examination.    Emergency Department Course   ECG  ECG taken at 2336, ECG read at 0349  Normal sinus rhythm. T wave abnormality, consider inferior ischemia. Abnormal ECG. S1 Q3 T3 present before.    No significant change as compared to prior, dated 10/26/2017.  Rate 82 bpm. MT interval 160 ms. QRS duration 108 ms. QT/QTc 390/455 ms. P-R-T axes 55 76 29.     Laboratory:  CMP: Chloride 110 (H), Glucose 131 (H), o/w WNL (Creatinine: 1.06)  CBC: WBC 5.8, HGB 13.4,      Emergency Department Course:    Reviewed:  I reviewed nursing notes, vitals, past medical history and care everywhere    Assessments:  0101 I obtained history and examined the patient as noted above.   0221 I rechecked the patient and explained findings.     Interventions:  0126 Zofran 4 mg IV  0126 Antivert 50 mg oral    Disposition:  The patient was discharged to home.     Impression & Plan     Medical Decision Making:  Michael S Rosler is a 60 year old male is here for acute onset dizziness, spinning, and vomiting which are " consistent with vertigo. He does not have any acute focal neurologic deficits in association with this, and I do not suspect central ideology such as stroke. Blood work does not show any contributing electrolyte abnormalities, renal insufficiency, or signs of infection. After medications here, symptoms have improved and he was ambulatory with a steady gate. He will be prescribed similar medications for use at home. He will be following up with his ENT physician and is aware that if symptoms are persistent that physical therapy referral is an option.     Covid-19  Michael S Rosler was evaluated during a global COVID-19 pandemic, which necessitated consideration that the patient might be at risk for infection with the SARS-CoV-2 virus that causes COVID-19.   Applicable protocols for evaluation were followed during the patient's care. COVID-19 was considered as part of the patient's evaluation.     Diagnosis:    ICD-10-CM    1. Vertigo  R42      Discharge Medications:  Discharge Medication List as of 6/27/2021  2:25 AM      START taking these medications    Details   meclizine (ANTIVERT) 25 MG tablet Take 1-2 tablets (25-50 mg) by mouth 4 times daily as needed for dizziness or nausea, Disp-20 tablet, R-0, E-Prescribe      ondansetron (ZOFRAN ODT) 4 MG ODT tab Take 1-2 tablets (4-8 mg) by mouth every 8 hours as needed for nausea, Disp-12 tablet, R-0, E-Prescribe           Scribe Disclosure:  Anaya LONG, am serving as a scribe at 1:02 AM on 6/27/2021 to document services personally performed by Gail Perrin MD based on my observations and the provider's statements to me.            Gail Perrin MD  06/27/21 0802

## 2021-10-24 ENCOUNTER — HEALTH MAINTENANCE LETTER (OUTPATIENT)
Age: 61
End: 2021-10-24

## 2021-12-30 ENCOUNTER — HOSPITAL ENCOUNTER (EMERGENCY)
Facility: CLINIC | Age: 61
Discharge: HOME | End: 2021-12-30
Attending: PHYSICIAN ASSISTANT | Admitting: PHYSICIAN ASSISTANT
Payer: COMMERCIAL

## 2021-12-30 VITALS
TEMPERATURE: 98.1 F | OXYGEN SATURATION: 99 % | RESPIRATION RATE: 16 BRPM | SYSTOLIC BLOOD PRESSURE: 100 MMHG | HEART RATE: 75 BPM | DIASTOLIC BLOOD PRESSURE: 75 MMHG

## 2021-12-30 DIAGNOSIS — Z46.6 ENCOUNTER FOR FOLEY CATHETER REMOVAL: ICD-10-CM

## 2021-12-30 PROCEDURE — 99282 EMERGENCY DEPT VISIT SF MDM: CPT

## 2021-12-30 NOTE — ED TRIAGE NOTES
Pt reports driving to [place] yesterday. Pt reports anglin placed yesterday, nbiw leaking around catheter. Wanting cath removed, started on Flomax yesterday.

## 2021-12-30 NOTE — ED PROVIDER NOTES
History     Chief Complaint:  Urinary Retention      HPI   Michael S Rosler is a 61 year old male who presents with catheter concern.  The patient has a history of BPH but is not on Flomax or medication normally for this.  Yesterday while driving down to [place] to help his son with moving he had been holding his urine more than normal on the long car ride, and then while passing through [place] developed increasing pain in the abdomen and pelvic area prompting them to go to the emergency department there.  He was found to have urinary retention with greater than 700 mL in his bladder and a Fuller catheter was placed.  His urinalysis did not suggest infection at this time and his kidney function and blood counts were unremarkable.  He notes that he has felt fine since but is not able to get into his urologist until Monday and has had some leakage of urine around the catheter since then and would like it removed.  He denies fever chills vomiting or other symptoms.  He was started on Flomax yesterday and has been taking this as directed.    Review of Systems   All other systems reviewed and are negative.      Allergies:  No Known Allergies      Medications:    meclizine (ANTIVERT) 25 MG tablet  Multiple Vitamins-Minerals (MULTIVITAMIN ADULTS PO)  PRAVASTATIN SODIUM PO  SUMAtriptan (IMITREX) 100 MG tablet        Past Medical History:    Past Medical History:   Diagnosis Date     Hyperlipidemia      Insomnia      Keratosis seborrheica      Kidney stone      Migraine      Other anxiety states      Pericarditis 4/15/2020     Patient Active Problem List    Diagnosis Date Noted     Pericarditis 04/15/2020     Priority: Medium     Sensorineural hearing loss, unilateral, right ear, with unrestricted hearing on the contralateral side 11/05/2019     Priority: Medium     RAFAEL (obstructive sleep apnea) 10/13/2017     Priority: Medium     4/1/2010 - [facility] Polysomnography - AHI 8.7/hr; RDI 34.1/hr; SpO2 constantin 88%.  Didn't tolerate  CPAP.  Using Mandibular Advancement Device made from Dr. PAULA Carney.       Hyperlipidemia      Priority: Medium     Other anxiety states      Priority: Medium        Past Surgical History:    Past Surgical History:   Procedure Laterality Date     HERNIA REPAIR, INGUINAL RT/LT  3/06    bilateral     VASECTOMY       VASECTOMY         Family History:    Family History   Problem Relation Age of Onset     Chronic Obstructive Pulmonary Disease Mother      Neurologic Disorder Sister      Brain Tumor Sister 58     Other - See Comments Father 84        old age       Social History:  Presents alone    Physical Exam     Patient Vitals for the past 24 hrs:   BP Temp Temp src Pulse Resp SpO2   12/30/21 1457 100/75 98.1  F (36.7  C) Temporal 75 16 99 %       Physical Exam  General: Awake, alert, pleasant, non-toxic.  Head:  Scalp is NC/AT  Eyes:  Conjunctiva normal, PERRL  ENT:  The external nose and ears are normal.   Neck:  Normal range of motion without rigidity.  CV:  Regular rate and rhythm    No pathologic murmur, rubs, or gallops.  Resp:  Breath sounds are clear bilaterally.    Non-labored, no retractions or accessory muscle use  Abdomen: Abdomen is soft, no distension, no tenderness, no masses. No CVA tenderness.  :  Anglin in place at urethral meatus.  Easily removed after deflating baloon.  No urethral bleeding or leakage.  MS:  No lower extremity edema or asymmetric calf swelling.    No midline cervical, thoracic, or lumbar tenderness  Skin:  Warm and dry, No rash or lesions noted  Neuro: Alert and oriented x3.  GCS 15 No gross motor deficits.  No facial asymmetry.  Psych: Awake. Alert. Normal affect. Appropriate interactions.    Emergency Department Course     Reviewed:  I reviewed nursing notes, vitals, past history and care everywhere    Assessments:  1600 I obtained history and examined the patient as noted above. I removed his anglin catheter.  1825 I rechecked the patient.  He has been able to void multiple times  without difficulty.    Interventions:  Medications - No data to display    Disposition:  The patient was discharged to home.    Impression & Plan      Medical Decision Making:  Pleasant 61-year-old male who presents for removal of Fuller catheter.  This was placed yesterday in [place] after he had urinary retention and he was started on Flomax.  He does not wish to leave the catheter in over the weekend until he sees his urologist on Monday.  His urine was checked at that time and showed no evidence of infection and his kidney function was normal.  His Fuller was removed per his wishes here today.  He feels very well and was able to void without difficulty multiple times.  He already has urology follow-up scheduled Monday.  I will have him continue Flomax as I suspect that his symptoms are due to underlying BPH.  He will return if unable to void, abdominal pain, fever, vomiting, or other new or worsening symptoms.    Diagnosis:    ICD-10-CM    1. Encounter for Fuller catheter removal  Z46.6        Discharge Medications:  New Prescriptions    No medications on file         Scribe Disclosure:  Parish LONG PA-C, am serving as a scribe at 4:29 PM on 12/30/2021 to document services personally performed by Parish Johnson PA-C based on my observations and the provider's statements to me.      Parish Johnson PA-C  12/30/21 [id number]

## 2022-02-13 ENCOUNTER — HEALTH MAINTENANCE LETTER (OUTPATIENT)
Age: 62
End: 2022-02-13

## 2022-10-15 ENCOUNTER — HEALTH MAINTENANCE LETTER (OUTPATIENT)
Age: 62
End: 2022-10-15

## 2023-03-26 ENCOUNTER — HEALTH MAINTENANCE LETTER (OUTPATIENT)
Age: 63
End: 2023-03-26

## 2024-05-26 ENCOUNTER — HEALTH MAINTENANCE LETTER (OUTPATIENT)
Age: 64
End: 2024-05-26